# Patient Record
Sex: FEMALE | Race: WHITE | Employment: OTHER | URBAN - METROPOLITAN AREA
[De-identification: names, ages, dates, MRNs, and addresses within clinical notes are randomized per-mention and may not be internally consistent; named-entity substitution may affect disease eponyms.]

---

## 2021-05-01 ENCOUNTER — APPOINTMENT (OUTPATIENT)
Dept: CT IMAGING | Age: 81
End: 2021-05-01
Attending: PHYSICIAN ASSISTANT
Payer: MEDICARE

## 2021-05-01 ENCOUNTER — APPOINTMENT (OUTPATIENT)
Dept: GENERAL RADIOLOGY | Age: 81
End: 2021-05-01
Attending: PHYSICIAN ASSISTANT
Payer: MEDICARE

## 2021-05-01 ENCOUNTER — APPOINTMENT (OUTPATIENT)
Dept: GENERAL RADIOLOGY | Age: 81
End: 2021-05-01
Attending: EMERGENCY MEDICINE
Payer: MEDICARE

## 2021-05-01 ENCOUNTER — HOSPITAL ENCOUNTER (EMERGENCY)
Age: 81
Discharge: HOME | End: 2021-05-01
Attending: EMERGENCY MEDICINE
Payer: MEDICARE

## 2021-05-01 VITALS
WEIGHT: 110 LBS | BODY MASS INDEX: 18.78 KG/M2 | RESPIRATION RATE: 17 BRPM | DIASTOLIC BLOOD PRESSURE: 65 MMHG | OXYGEN SATURATION: 100 % | SYSTOLIC BLOOD PRESSURE: 139 MMHG | HEART RATE: 60 BPM | HEIGHT: 64 IN
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
## 2021-05-01 DIAGNOSIS — S52.021A OLECRANON FRACTURE, RIGHT, CLOSED, INITIAL ENCOUNTER: Primary | ICD-10-CM

## 2021-05-01 DIAGNOSIS — S82.002A CLOSED NONDISPLACED FRACTURE OF LEFT PATELLA, UNSPECIFIED FRACTURE MORPHOLOGY, INITIAL ENCOUNTER: ICD-10-CM

## 2021-05-01 PROCEDURE — 73080 X-RAY EXAM OF ELBOW: CPT

## 2021-05-01 PROCEDURE — 99283 EMERGENCY DEPT VISIT LOW MDM: CPT

## 2021-05-01 PROCEDURE — 73562 X-RAY EXAM OF KNEE 3: CPT

## 2021-05-01 PROCEDURE — 74011250637 HC RX REV CODE- 250/637: Performed by: PHYSICIAN ASSISTANT

## 2021-05-01 PROCEDURE — 70450 CT HEAD/BRAIN W/O DYE: CPT

## 2021-05-01 RX ORDER — HYDROCODONE BITARTRATE AND ACETAMINOPHEN 5; 325 MG/1; MG/1
1 TABLET ORAL
Qty: 10 TAB | Refills: 0 | Status: SHIPPED | OUTPATIENT
Start: 2021-05-01 | End: 2021-05-04

## 2021-05-01 RX ORDER — HYDROCODONE BITARTRATE AND ACETAMINOPHEN 5; 325 MG/1; MG/1
1 TABLET ORAL
Status: COMPLETED | OUTPATIENT
Start: 2021-05-01 | End: 2021-05-01

## 2021-05-01 RX ADMIN — HYDROCODONE BITARTRATE AND ACETAMINOPHEN 1 TABLET: 5; 325 TABLET ORAL at 12:58

## 2021-05-01 NOTE — ED TRIAGE NOTES
Pt reports she tripped on uneven pavement today injuring her right elbow and scraping her left knee, pt reports she may have hit her head but no lumps/bumps/bruising noted at this time. No LOC, pt does not take blood thinners.

## 2021-05-01 NOTE — ED NOTES
I have reviewed discharge instructions with the patient. The patient verbalized understanding. Patient left ED via Discharge Method: wheelchair to Home with daughter. Opportunity for questions and clarification provided. Patient given 1 scripts. To continue your aftercare when you leave the hospital, you may receive an automated call from our care team to check in on how you are doing. This is a free service and part of our promise to provide the best care and service to meet your aftercare needs.  If you have questions, or wish to unsubscribe from this service please call [phone]. Thank you for Choosing our [facility] Emergency Department.

## 2021-05-01 NOTE — ED PROVIDER NOTES
Patient is an 55-year-old female who presents to the emergency room with chief complaint of fall just prior to arrival.  She was at an [employer], tripped over a small elevated piece of concrete. She does not believe she hit her head however she did have some dizziness after the fall. She fell on her right elbow, left knee. Denies presyncopal symptoms such as chest pain, palpitations, shortness of breath, loss of consciousness. History reviewed. No pertinent past medical history. History reviewed. No pertinent surgical history. History reviewed. No pertinent family history.     Social History     Socioeconomic History    Marital status: Not on file     Spouse name: Not on file    Number of children: Not on file    Years of education: Not on file    Highest education level: Not on file   Occupational History    Not on file   Social Needs    Financial resource strain: Not on file    Food insecurity     Worry: Not on file     Inability: Not on file    Transportation needs     Medical: Not on file     Non-medical: Not on file   Tobacco Use    Smoking status: Never Smoker    Smokeless tobacco: Never Used   Substance and Sexual Activity    Alcohol use: Yes     Comment: occ    Drug use: Never    Sexual activity: Not on file   Lifestyle    Physical activity     Days per week: Not on file     Minutes per session: Not on file    Stress: Not on file   Relationships    Social connections     Talks on phone: Not on file     Gets together: Not on file     Attends Confucianist service: Not on file     Active member of club or organization: Not on file     Attends meetings of clubs or organizations: Not on file     Relationship status: Not on file    Intimate partner violence     Fear of current or ex partner: Not on file     Emotionally abused: Not on file     Physically abused: Not on file     Forced sexual activity: Not on file   Other Topics Concern    Not on file   Social History Narrative  Not on file         ALLERGIES: Compazine [prochlorperazine]    Review of Systems   Constitutional: Negative for chills and fever. HENT: Negative for facial swelling. Respiratory: Negative for chest tightness and shortness of breath. Cardiovascular: Negative for chest pain. Gastrointestinal: Negative for abdominal pain, nausea and vomiting. Musculoskeletal: Positive for arthralgias and joint swelling. Negative for myalgias. Skin: Positive for wound. Neurological: Negative for headaches. Psychiatric/Behavioral: Negative for confusion. All other systems reviewed and are negative. Vitals:    05/01/21 1119   BP: (!) 92/43   Pulse: (!) 56   Resp: 18   SpO2: 96%   Weight: 49.9 kg (110 lb)   Height: 5' 4\" (1.626 m)            Physical Exam  Constitutional:       Appearance: Normal appearance. HENT:      Head: Normocephalic and atraumatic. Mouth/Throat:      Mouth: Mucous membranes are moist.   Eyes:      Pupils: Pupils are equal, round, and reactive to light. Cardiovascular:      Rate and Rhythm: Normal rate and regular rhythm. Pulmonary:      Effort: No respiratory distress. Breath sounds: Normal breath sounds. Abdominal:      Palpations: Abdomen is soft. Tenderness: There is no abdominal tenderness. There is no guarding. Musculoskeletal:      Right elbow: She exhibits deformity. Left knee: Tenderness found. Legs:    Skin:     General: Skin is warm and dry. Neurological:      Mental Status: She is alert and oriented to person, place, and time. Mental status is at baseline. Psychiatric:         Mood and Affect: Mood normal.          MDM  Number of Diagnoses or Management Options  Closed nondisplaced fracture of left patella, unspecified fracture morphology, initial encounter  Fall from ground level  Olecranon fracture, right, closed, initial encounter  Diagnosis management comments:  This is an 22-year-old female who presented to the emergency room with chief complaint of having a fall just prior to arrival.  She landed directly on her left knee and right elbow. She had some short-lived dizziness after the accident. Her work-up today reveals a negative head CT for any acute intracranial abnormality. Her left knee x-ray shows a nondisplaced incomplete fracture of the anterior patella. X-ray of elbow demonstrates an olecranon process fracture with proximal avulsion. On-call orthopedics was consulted. Per Dr. John Michelle, patient can be placed in a posterior arm splint and knee immobilizer. Follow-up outpatient with Dr. Kenney Brand. Patient's pain was controlled in the emergency room with p.o. hydrocodone, she was sent home with pain medications as well. Patient understands indications for which to return to emergency room. Patient case was discussed with attending Dr. Lewis Serrano.        Amount and/or Complexity of Data Reviewed  Tests in the radiology section of CPT®: ordered and reviewed  Discussion of test results with the performing providers: yes  Decide to obtain previous medical records or to obtain history from someone other than the patient: yes (Daughter)  Obtain history from someone other than the patient: yes  Discuss the patient with other providers: yes  Independent visualization of images, tracings, or specimens: yes    Risk of Complications, Morbidity, and/or Mortality  Presenting problems: high  Diagnostic procedures: high  Management options: high    Patient Progress  Patient progress: improved         Procedures

## 2021-05-04 ENCOUNTER — ANESTHESIA EVENT (OUTPATIENT)
Dept: SURGERY | Age: 81
End: 2021-05-04
Payer: MEDICARE

## 2021-05-04 NOTE — H&P
History and physical    Patient: Alaina Alvarez MRN: [id number]  SSN: [id number]    YOB: 1940  Age: [de-identified] y.o. Sex: female      Subjective:      Alaina Alvarez is a [de-identified] y.o. female who fell and injured her right upper extremity as well as her left lower extremity. Her left knee really does not bother her much at all she does have an abrasion over this area and she does have a very small nondisplaced left patella fracture. The thing that is really bothering her the most is her right upper extremity. This is given her quite a bit of discomfort. She is a very healthy individual she is never had any surgery before she has never been hospitalized before she takes no medications. She is right-hand dominant. .    No past medical history on file. No past surgical history on file. FAMHX -No history of inflammatory arthritis   Social History     Tobacco Use    Smoking status: Never Smoker    Smokeless tobacco: Never Used   Substance Use Topics    Alcohol use: Yes     Comment: occ      Current Outpatient Medications   Medication Sig Dispense Refill    HYDROcodone-acetaminophen (Norco) 5-325 mg per tablet Take 1 Tab by mouth every six (6) hours as needed for Pain for up to 3 days. Max Daily Amount: 4 Tabs. 10 Tab 0        Allergies   Allergen Reactions    Compazine [Prochlorperazine] Other (comments)       Review of Systems:  A comprehensive review of systems was negative except for that written in the History of Present Illness. Objective: There were no vitals filed for this visit. Physical Exam:  Physical Exam:  General:  Alert, cooperative, no distress, appears stated age. Orientation she is alert and oriented person place time and situation   Eyes:  Conjunctivae/corneas clear. PERRL, EOMs intact. Fundi benign   Ears:  Normal TMs and external ear canals both ears. Nose: Nares normal. Septum midline. Mucosa normal. No drainage or sinus tenderness.    Mouth/Throat: Lips, mucosa, and tongue normal. Teeth and gums normal.   Neck: Supple, symmetrical, trachea midline, no adenopathy, thyroid: no enlargment/tenderness/nodules, no carotid bruit and no JVD. Back:   Symmetric, no curvature. ROM normal. No CVA tenderness. Lungs:   Clear to auscultation bilaterally. Heart:  Regular rate and rhythm, S1, S2 normal, no murmur, click, rub or gallop. Abdomen:   Soft, non-tender. Bowel sounds normal. No masses,  No organomegaly. No lymphadenopathy in all 4 extremities  Alignment-she has some obvious deformity of her right elbow no deformity of her left knee  Range of motion-pain with any range of motion of her right elbow, she does have full range of motion of her left knee actively with very little discomfort  Vascular-distal pulses palpable in both the right upper as well as left lower extremity  Sensory/motor-deep tendon reflexes normal both the right upper as well as the left lower extremity. Motor and sensory function intact. Stability-she does have some obvious instability at her right elbow no evidence of any instability left knee  Tenderness to palpation over the olecranon as well as just to a very small amount over the anterior aspect of the patella  Skin-she has no open wounds over her right upper extremity she does have a superficial abrasion over the superior aspect of her left knee which is not near her fracture site  Rodo Rosario has a near normal gait    Assessment:       Right closed displaced olecranon fracture, closed nondisplaced left patella fracture    Xrays and or studies:    I have reviewed x-rays which show a completely displaced right olecranon fracture, I have also reviewed her x-rays of her left knee.   These show a completely nondisplaced left patella fracture it appears that it may involve the anterior cortex and may not even go completely across the cortical surface  Plan:     I think with the fact that she is able to move her left knee so well and with the unimpressive appearance of her x-rays it would be reasonable just to treat the patella nonoperatively. I think him to take her out of her brace and allow her to be activity as tolerated on this. As far as her right upper extremity is concerned this is significantly displaced and does need to be treated with formal open reduction internal fixation. The plan will be to proceed with open reduction internal fixation of right olecranon fracture as an outpatient. I explained to the patient that I would likely either use a large 7.3 cannulated screw or a small standstill plate and screw construct and I view some illustrations to help her understand this. The plan will be to proceed with this this week as an outpatient.     Signed By: Delaney Delgadillo MD     May 4, 2021

## 2021-05-05 ENCOUNTER — ANESTHESIA (OUTPATIENT)
Dept: SURGERY | Age: 81
End: 2021-05-05
Payer: MEDICARE

## 2021-05-05 ENCOUNTER — HOSPITAL ENCOUNTER (OUTPATIENT)
Age: 81
Setting detail: OUTPATIENT SURGERY
Discharge: HOME | End: 2021-05-05
Attending: ORTHOPAEDIC SURGERY | Admitting: ORTHOPAEDIC SURGERY
Payer: MEDICARE

## 2021-05-05 ENCOUNTER — APPOINTMENT (OUTPATIENT)
Dept: GENERAL RADIOLOGY | Age: 81
End: 2021-05-05
Attending: ORTHOPAEDIC SURGERY
Payer: MEDICARE

## 2021-05-05 VITALS
RESPIRATION RATE: 16 BRPM | OXYGEN SATURATION: 98 % | DIASTOLIC BLOOD PRESSURE: 63 MMHG | HEIGHT: 64 IN | WEIGHT: 109.25 LBS | BODY MASS INDEX: 18.65 KG/M2 | HEART RATE: 65 BPM | TEMPERATURE: 98 F | SYSTOLIC BLOOD PRESSURE: 130 MMHG

## 2021-05-05 DIAGNOSIS — S52.021A OLECRANON FRACTURE, RIGHT, CLOSED, INITIAL ENCOUNTER: Primary | ICD-10-CM

## 2021-05-05 LAB
ABO + RH BLD: NORMAL
BASOPHILS # BLD: 0.1 K/UL (ref 0–0.2)
BASOPHILS NFR BLD: 1 % (ref 0–2)
BLOOD GROUP ANTIBODIES SERPL: NORMAL
DIFFERENTIAL METHOD BLD: ABNORMAL
EOSINOPHIL # BLD: 0 K/UL (ref 0–0.8)
EOSINOPHIL NFR BLD: 0 % (ref 0.5–7.8)
ERYTHROCYTE [DISTWIDTH] IN BLOOD BY AUTOMATED COUNT: 13.3 % (ref 11.9–14.6)
HCT VFR BLD AUTO: 35.3 % (ref 35.8–46.3)
HGB BLD-MCNC: 11.6 G/DL (ref 11.7–15.4)
IMM GRANULOCYTES # BLD AUTO: 0.1 K/UL (ref 0–0.5)
IMM GRANULOCYTES NFR BLD AUTO: 1 % (ref 0–5)
LYMPHOCYTES # BLD: 0.9 K/UL (ref 0.5–4.6)
LYMPHOCYTES NFR BLD: 8 % (ref 13–44)
MCH RBC QN AUTO: 30.3 PG (ref 26.1–32.9)
MCHC RBC AUTO-ENTMCNC: 32.9 G/DL (ref 31.4–35)
MCV RBC AUTO: 92.2 FL (ref 79.6–97.8)
MONOCYTES # BLD: 0.8 K/UL (ref 0.1–1.3)
MONOCYTES NFR BLD: 7 % (ref 4–12)
NEUTS SEG # BLD: 9.2 K/UL (ref 1.7–8.2)
NEUTS SEG NFR BLD: 83 % (ref 43–78)
NRBC # BLD: 0 K/UL (ref 0–0.2)
PLATELET # BLD AUTO: 191 K/UL (ref 150–450)
PMV BLD AUTO: 11.4 FL (ref 9.4–12.3)
RBC # BLD AUTO: 3.83 M/UL (ref 4.05–5.2)
SPECIMEN EXP DATE BLD: NORMAL
WBC # BLD AUTO: 11 K/UL (ref 4.3–11.1)

## 2021-05-05 PROCEDURE — 74011250636 HC RX REV CODE- 250/636: Performed by: NURSE ANESTHETIST, CERTIFIED REGISTERED

## 2021-05-05 PROCEDURE — 77030003602 HC NDL NRV BLK BBMI -B: Performed by: STUDENT IN AN ORGANIZED HEALTH CARE EDUCATION/TRAINING PROGRAM

## 2021-05-05 PROCEDURE — 74011250636 HC RX REV CODE- 250/636: Performed by: ORTHOPAEDIC SURGERY

## 2021-05-05 PROCEDURE — 24685 OPTX ULNAR FX PROX END W/FIX: CPT | Performed by: ORTHOPAEDIC SURGERY

## 2021-05-05 PROCEDURE — C1769 GUIDE WIRE: HCPCS | Performed by: ORTHOPAEDIC SURGERY

## 2021-05-05 PROCEDURE — 76210000006 HC OR PH I REC 0.5 TO 1 HR: Performed by: ORTHOPAEDIC SURGERY

## 2021-05-05 PROCEDURE — 76060000033 HC ANESTHESIA 1 TO 1.5 HR: Performed by: ORTHOPAEDIC SURGERY

## 2021-05-05 PROCEDURE — C1713 ANCHOR/SCREW BN/BN,TIS/BN: HCPCS | Performed by: ORTHOPAEDIC SURGERY

## 2021-05-05 PROCEDURE — 73070 X-RAY EXAM OF ELBOW: CPT

## 2021-05-05 PROCEDURE — 74011250636 HC RX REV CODE- 250/636: Performed by: STUDENT IN AN ORGANIZED HEALTH CARE EDUCATION/TRAINING PROGRAM

## 2021-05-05 PROCEDURE — 76010000161 HC OR TIME 1 TO 1.5 HR INTENSV-TIER 1: Performed by: ORTHOPAEDIC SURGERY

## 2021-05-05 PROCEDURE — 76010010054 HC POST OP PAIN BLOCK: Performed by: ORTHOPAEDIC SURGERY

## 2021-05-05 PROCEDURE — 77030010509 HC AIRWY LMA MSK TELE -A: Performed by: STUDENT IN AN ORGANIZED HEALTH CARE EDUCATION/TRAINING PROGRAM

## 2021-05-05 PROCEDURE — 76210000020 HC REC RM PH II FIRST 0.5 HR: Performed by: ORTHOPAEDIC SURGERY

## 2021-05-05 PROCEDURE — 74011250637 HC RX REV CODE- 250/637: Performed by: ORTHOPAEDIC SURGERY

## 2021-05-05 PROCEDURE — 85025 COMPLETE CBC W/AUTO DIFF WBC: CPT

## 2021-05-05 PROCEDURE — 86850 RBC ANTIBODY SCREEN: CPT

## 2021-05-05 PROCEDURE — 77030006788 HC BLD SAW OSC STRY -B: Performed by: ORTHOPAEDIC SURGERY

## 2021-05-05 PROCEDURE — 77030040922 HC BLNKT HYPOTHRM STRY -A: Performed by: STUDENT IN AN ORGANIZED HEALTH CARE EDUCATION/TRAINING PROGRAM

## 2021-05-05 PROCEDURE — 76942 ECHO GUIDE FOR BIOPSY: CPT | Performed by: ORTHOPAEDIC SURGERY

## 2021-05-05 PROCEDURE — 2709999900 HC NON-CHARGEABLE SUPPLY: Performed by: ORTHOPAEDIC SURGERY

## 2021-05-05 PROCEDURE — 77030004434 HC BUR RND STRY -B: Performed by: ORTHOPAEDIC SURGERY

## 2021-05-05 PROCEDURE — 74011000250 HC RX REV CODE- 250: Performed by: NURSE ANESTHETIST, CERTIFIED REGISTERED

## 2021-05-05 PROCEDURE — 77030016458 HC BIT DRL CANN1 SYNT -F: Performed by: ORTHOPAEDIC SURGERY

## 2021-05-05 PROCEDURE — 77030019908 HC STETH ESOPH SIMS -A: Performed by: STUDENT IN AN ORGANIZED HEALTH CARE EDUCATION/TRAINING PROGRAM

## 2021-05-05 PROCEDURE — 77030020754 HC CUF TRNQT 2BLA STRY -B: Performed by: ORTHOPAEDIC SURGERY

## 2021-05-05 DEVICE — WASHER ORTH DIA13MM FOR CANN SCR: Type: IMPLANTABLE DEVICE | Site: ELBOW | Status: FUNCTIONAL

## 2021-05-05 DEVICE — IMPLANTABLE DEVICE: Type: IMPLANTABLE DEVICE | Site: ELBOW | Status: FUNCTIONAL

## 2021-05-05 RX ORDER — FENTANYL CITRATE 50 UG/ML
100 INJECTION, SOLUTION INTRAMUSCULAR; INTRAVENOUS ONCE
Status: COMPLETED | OUTPATIENT
Start: 2021-05-05 | End: 2021-05-05

## 2021-05-05 RX ORDER — ROPIVACAINE HYDROCHLORIDE 5 MG/ML
INJECTION, SOLUTION EPIDURAL; INFILTRATION; PERINEURAL
Status: DISCONTINUED | OUTPATIENT
Start: 2021-05-05 | End: 2021-05-05 | Stop reason: HOSPADM

## 2021-05-05 RX ORDER — SODIUM CHLORIDE 0.9 % (FLUSH) 0.9 %
5-40 SYRINGE (ML) INJECTION EVERY 8 HOURS
Status: DISCONTINUED | OUTPATIENT
Start: 2021-05-05 | End: 2021-05-05 | Stop reason: HOSPADM

## 2021-05-05 RX ORDER — CEFAZOLIN SODIUM/WATER 2 G/20 ML
2 SYRINGE (ML) INTRAVENOUS ONCE
Status: DISCONTINUED | OUTPATIENT
Start: 2021-05-05 | End: 2021-05-05

## 2021-05-05 RX ORDER — DIPHENHYDRAMINE HYDROCHLORIDE 50 MG/ML
12.5 INJECTION, SOLUTION INTRAMUSCULAR; INTRAVENOUS
Status: DISCONTINUED | OUTPATIENT
Start: 2021-05-05 | End: 2021-05-05 | Stop reason: HOSPADM

## 2021-05-05 RX ORDER — LIDOCAINE HYDROCHLORIDE 20 MG/ML
INJECTION, SOLUTION EPIDURAL; INFILTRATION; INTRACAUDAL; PERINEURAL AS NEEDED
Status: DISCONTINUED | OUTPATIENT
Start: 2021-05-05 | End: 2021-05-05 | Stop reason: HOSPADM

## 2021-05-05 RX ORDER — PROPOFOL 10 MG/ML
INJECTION, EMULSION INTRAVENOUS AS NEEDED
Status: DISCONTINUED | OUTPATIENT
Start: 2021-05-05 | End: 2021-05-05 | Stop reason: HOSPADM

## 2021-05-05 RX ORDER — OXYCODONE HYDROCHLORIDE 5 MG/1
5 TABLET ORAL
Status: DISCONTINUED | OUTPATIENT
Start: 2021-05-05 | End: 2021-05-05 | Stop reason: HOSPADM

## 2021-05-05 RX ORDER — SODIUM CHLORIDE 0.9 % (FLUSH) 0.9 %
5-40 SYRINGE (ML) INJECTION AS NEEDED
Status: DISCONTINUED | OUTPATIENT
Start: 2021-05-05 | End: 2021-05-05 | Stop reason: HOSPADM

## 2021-05-05 RX ORDER — MIDAZOLAM HYDROCHLORIDE 1 MG/ML
2 INJECTION, SOLUTION INTRAMUSCULAR; INTRAVENOUS ONCE
Status: COMPLETED | OUTPATIENT
Start: 2021-05-05 | End: 2021-05-05

## 2021-05-05 RX ORDER — LIDOCAINE HYDROCHLORIDE 10 MG/ML
0.1 INJECTION INFILTRATION; PERINEURAL AS NEEDED
Status: DISCONTINUED | OUTPATIENT
Start: 2021-05-05 | End: 2021-05-05 | Stop reason: HOSPADM

## 2021-05-05 RX ORDER — ONDANSETRON 2 MG/ML
INJECTION INTRAMUSCULAR; INTRAVENOUS AS NEEDED
Status: DISCONTINUED | OUTPATIENT
Start: 2021-05-05 | End: 2021-05-05 | Stop reason: HOSPADM

## 2021-05-05 RX ORDER — FLUMAZENIL 0.1 MG/ML
0.2 INJECTION INTRAVENOUS
Status: DISCONTINUED | OUTPATIENT
Start: 2021-05-05 | End: 2021-05-05 | Stop reason: HOSPADM

## 2021-05-05 RX ORDER — EPHEDRINE SULFATE/0.9% NACL/PF 50 MG/5 ML
SYRINGE (ML) INTRAVENOUS AS NEEDED
Status: DISCONTINUED | OUTPATIENT
Start: 2021-05-05 | End: 2021-05-05 | Stop reason: HOSPADM

## 2021-05-05 RX ORDER — SODIUM CHLORIDE, SODIUM LACTATE, POTASSIUM CHLORIDE, CALCIUM CHLORIDE 600; 310; 30; 20 MG/100ML; MG/100ML; MG/100ML; MG/100ML
100 INJECTION, SOLUTION INTRAVENOUS CONTINUOUS
Status: DISCONTINUED | OUTPATIENT
Start: 2021-05-05 | End: 2021-05-05 | Stop reason: HOSPADM

## 2021-05-05 RX ORDER — NALOXONE HYDROCHLORIDE 0.4 MG/ML
0.1 INJECTION, SOLUTION INTRAMUSCULAR; INTRAVENOUS; SUBCUTANEOUS AS NEEDED
Status: DISCONTINUED | OUTPATIENT
Start: 2021-05-05 | End: 2021-05-05 | Stop reason: HOSPADM

## 2021-05-05 RX ORDER — HYDROCODONE BITARTRATE AND ACETAMINOPHEN 5; 325 MG/1; MG/1
2 TABLET ORAL
Qty: 35 TAB | Refills: 0 | Status: SHIPPED | OUTPATIENT
Start: 2021-05-05 | End: 2021-05-10

## 2021-05-05 RX ORDER — NALOXONE HYDROCHLORIDE 0.4 MG/ML
0.1 INJECTION, SOLUTION INTRAMUSCULAR; INTRAVENOUS; SUBCUTANEOUS
Status: DISCONTINUED | OUTPATIENT
Start: 2021-05-05 | End: 2021-05-05 | Stop reason: HOSPADM

## 2021-05-05 RX ORDER — DEXAMETHASONE SODIUM PHOSPHATE 4 MG/ML
INJECTION, SOLUTION INTRA-ARTICULAR; INTRALESIONAL; INTRAMUSCULAR; INTRAVENOUS; SOFT TISSUE AS NEEDED
Status: DISCONTINUED | OUTPATIENT
Start: 2021-05-05 | End: 2021-05-05 | Stop reason: HOSPADM

## 2021-05-05 RX ORDER — HYDROMORPHONE HYDROCHLORIDE 1 MG/ML
0.5 INJECTION, SOLUTION INTRAMUSCULAR; INTRAVENOUS; SUBCUTANEOUS
Status: DISCONTINUED | OUTPATIENT
Start: 2021-05-05 | End: 2021-05-05 | Stop reason: HOSPADM

## 2021-05-05 RX ORDER — CEFAZOLIN SODIUM/WATER 2 G/20 ML
2 SYRINGE (ML) INTRAVENOUS ONCE
Status: COMPLETED | OUTPATIENT
Start: 2021-05-05 | End: 2021-05-05

## 2021-05-05 RX ADMIN — Medication 10 MG: at 11:53

## 2021-05-05 RX ADMIN — LIDOCAINE HYDROCHLORIDE 40 MG: 20 INJECTION, SOLUTION EPIDURAL; INFILTRATION; INTRACAUDAL; PERINEURAL at 11:42

## 2021-05-05 RX ADMIN — Medication 10 MG: at 11:52

## 2021-05-05 RX ADMIN — CEFAZOLIN 2 G: 1 INJECTION, POWDER, FOR SOLUTION INTRAVENOUS at 11:46

## 2021-05-05 RX ADMIN — MIDAZOLAM 1 MG: 1 INJECTION INTRAMUSCULAR; INTRAVENOUS at 09:46

## 2021-05-05 RX ADMIN — SODIUM CHLORIDE, SODIUM LACTATE, POTASSIUM CHLORIDE, AND CALCIUM CHLORIDE 100 ML/HR: 600; 310; 30; 20 INJECTION, SOLUTION INTRAVENOUS at 09:34

## 2021-05-05 RX ADMIN — ROPIVACAINE HYDROCHLORIDE 20 ML: 150 INJECTION, SOLUTION EPIDURAL; INFILTRATION; PERINEURAL at 09:48

## 2021-05-05 RX ADMIN — FENTANYL CITRATE 50 MCG: 50 INJECTION, SOLUTION INTRAMUSCULAR; INTRAVENOUS at 09:46

## 2021-05-05 RX ADMIN — PROPOFOL 80 MG: 10 INJECTION, EMULSION INTRAVENOUS at 11:42

## 2021-05-05 RX ADMIN — DEXAMETHASONE SODIUM PHOSPHATE 4 MG: 4 INJECTION, SOLUTION INTRAMUSCULAR; INTRAVENOUS at 12:33

## 2021-05-05 RX ADMIN — SODIUM CHLORIDE, SODIUM LACTATE, POTASSIUM CHLORIDE, AND CALCIUM CHLORIDE: 600; 310; 30; 20 INJECTION, SOLUTION INTRAVENOUS at 12:34

## 2021-05-05 RX ADMIN — ONDANSETRON 4 MG: 2 INJECTION INTRAMUSCULAR; INTRAVENOUS at 12:33

## 2021-05-05 RX ADMIN — Medication 3 AMPULE: at 09:34

## 2021-05-05 NOTE — ANESTHESIA PREPROCEDURE EVALUATION
Relevant Problems   No relevant active problems       Anesthetic History   No history of anesthetic complications            Review of Systems / Medical History  Patient summary reviewed, nursing notes reviewed and pertinent labs reviewed    Pulmonary  Within defined limits                 Neuro/Psych   Within defined limits           Cardiovascular              Hyperlipidemia    Exercise tolerance: >4 METS     GI/Hepatic/Renal  Within defined limits              Endo/Other  Within defined limits           Other Findings              Physical Exam    Airway  Mallampati: II  TM Distance: 4 - 6 cm  Neck ROM: normal range of motion   Mouth opening: Normal     Cardiovascular  Regular rate and rhythm,  S1 and S2 normal,  no murmur, click, rub, or gallop             Dental    Dentition: Poor dentition     Pulmonary  Breath sounds clear to auscultation               Abdominal         Other Findings            Anesthetic Plan    ASA: 2  Anesthesia type: general      Post-op pain plan if not by surgeon: peripheral nerve block single    Induction: Intravenous  Anesthetic plan and risks discussed with: Patient

## 2021-05-05 NOTE — DISCHARGE INSTRUCTIONS
ACTIVITY AFTER DISCHARGE Patient should: Restrict lifting Leave splint in place, no lifting right upper extremity   Leave splint in place, no lifting right upper extremity  Patient should: Restrict lifting  DIET REGULAR Resume her regular diet  DRESSING, DO NOT REMOVE Keep clean, dry and intact until next clinic visit      ACTIVITY  · As tolerated and as directed by your doctor. · Bathe or shower as directed by your doctor. DIET  · Clear liquids until no nausea or vomiting; then light diet for the first day. · Advance to regular diet on second day, unless your doctor orders otherwise. · If nausea and vomiting continues, call your doctor. PAIN  · Take pain medication as directed by your doctor. · Call your doctor if pain is NOT relieved by medication. · DO NOT take aspirin of blood thinners unless directed by your doctor. CALL YOUR DOCTOR IF   · Excessive bleeding that does not stop after holding pressure over the area  · Temperature of 101 degrees F or above  · Excessive redness, swelling or bruising, and/ or green or yellow, smelly discharge from incision    After general anesthesia or intravenous sedation, for 24 hours or while taking prescription Narcotics:  · Limit your activities  · A responsible adult needs to be with you for the next 24 hours  · Do not drive and operate hazardous machinery  · Do not make important personal or business decisions  · Do not drink alcoholic beverages  · If you have not urinated within 8 hours after discharge, and you are experiencing discomfort from urinary retention, please go to the nearest ED. · If you have sleep apnea and have a CPAP machine, please use it for all naps and sleeping. · Please use caution when taking narcotics and any of your home medications that may cause drowsiness. *  Please give a list of your current medications to your Primary Care Provider.   *  Please update this list whenever your medications are discontinued, doses are changed, or new medications (including over-the-counter products) are added. *  Please carry medication information at all times in case of emergency situations. These are general instructions for a healthy lifestyle:  No smoking/ No tobacco products/ Avoid exposure to second hand smoke  Surgeon General's Warning:  Quitting smoking now greatly reduces serious risk to your health. Obesity, smoking, and sedentary lifestyle greatly increases your risk for illness  A healthy diet, regular physical exercise & weight monitoring are important for maintaining a healthy lifestyle    You may be retaining fluid if you have a history of heart failure or if you experience any of the following symptoms:  Weight gain of 3 pounds or more overnight or 5 pounds in a week, increased swelling in our hands or feet or shortness of breath while lying flat in bed. Please call your doctor as soon as you notice any of these symptoms; do not wait until your next office visit.

## 2021-05-05 NOTE — H&P
History and physical     Patient: Katrin Shelley MRN: [id number]  SSN: [id number]    YOB: 1940  Age: [de-identified] y.o. Sex: female       Subjective:      Katrin Shelley is a [de-identified] y.o. female who fell and injured her right upper extremity as well as her left lower extremity. Her left knee really does not bother her much at all she does have an abrasion over this area and she does have a very small nondisplaced left patella fracture. The thing that is really bothering her the most is her right upper extremity. This is given her quite a bit of discomfort. She is a very healthy individual she is never had any surgery before she has never been hospitalized before she takes no medications. She is right-hand dominant. .     No past medical history on file. No past surgical history on file. FAMHX -No history of inflammatory arthritis   Social History            Tobacco Use    Smoking status: Never Smoker    Smokeless tobacco: Never Used   Substance Use Topics    Alcohol use: Yes       Comment: occ             Current Outpatient Medications   Medication Sig Dispense Refill    HYDROcodone-acetaminophen (Norco) 5-325 mg per tablet Take 1 Tab by mouth every six (6) hours as needed for Pain for up to 3 days. Max Daily Amount: 4 Tabs. 10 Tab 0              Allergies   Allergen Reactions    Compazine [Prochlorperazine] Other (comments)         Review of Systems:  A comprehensive review of systems was negative except for that written in the History of Present Illness.     Objective:      There were no vitals filed for this visit.      Physical Exam:  Physical Exam:  General:  Alert, cooperative, no distress, appears stated age. Orientation she is alert and oriented person place time and situation   Eyes:  Conjunctivae/corneas clear. PERRL, EOMs intact. Fundi benign   Ears:  Normal TMs and external ear canals both ears. Nose: Nares normal. Septum midline. Mucosa normal. No drainage or sinus tenderness. Mouth/Throat: Lips, mucosa, and tongue normal. Teeth and gums normal.   Neck: Supple, symmetrical, trachea midline, no adenopathy, thyroid: no enlargment/tenderness/nodules, no carotid bruit and no JVD. Back:   Symmetric, no curvature. ROM normal. No CVA tenderness. Lungs:   Clear to auscultation bilaterally. Heart:  Regular rate and rhythm, S1, S2 normal, no murmur, click, rub or gallop. Abdomen:   Soft, non-tender. Bowel sounds normal. No masses,  No organomegaly.         No lymphadenopathy in all 4 extremities  Alignment-she has some obvious deformity of her right elbow no deformity of her left knee  Range of motion-pain with any range of motion of her right elbow, she does have full range of motion of her left knee actively with very little discomfort  Vascular-distal pulses palpable in both the right upper as well as left lower extremity  Sensory/motor-deep tendon reflexes normal both the right upper as well as the left lower extremity. Motor and sensory function intact. Stability-she does have some obvious instability at her right elbow no evidence of any instability left knee  Tenderness to palpation over the olecranon as well as just to a very small amount over the anterior aspect of the patella  Skin-she has no open wounds over her right upper extremity she does have a superficial abrasion over the superior aspect of her left knee which is not near her fracture site  Aleyda Colon has a near normal gait     Assessment:      Right closed displaced olecranon fracture, closed nondisplaced left patella fracture     Xrays and or studies:     I have reviewed x-rays which show a completely displaced right olecranon fracture, I have also reviewed her x-rays of her left knee.   These show a completely nondisplaced left patella fracture it appears that it may involve the anterior cortex and may not even go completely across the cortical surface  Plan:      I think with the fact that she is able to move her left knee so well and with the unimpressive appearance of her x-rays it would be reasonable just to treat the patella nonoperatively. I think him to take her out of her brace and allow her to be activity as tolerated on this. As far as her right upper extremity is concerned this is significantly displaced and does need to be treated with formal open reduction internal fixation. The plan will be to proceed with open reduction internal fixation of right olecranon fracture as an outpatient. I explained to the patient that I would likely either use a large 7.3 cannulated screw or a small standstill plate and screw construct and I view some illustrations to help her understand this.   The plan will be to proceed with this this week as an outpatient.     Signed By: Simeon Li MD      May 4, 2021           Electronically signed by Sheridan Sadler MD at 05/04/21 6277  Note Details    Author Sheridan Sadler MD File Time 05/04/21 1235   Author Type Physician Status Signed   Last  Sheridan Sadler MD Specialty Orthopedic Surgery      Office Visit on 5/4/2021        Detailed Report        Note shared with patient

## 2021-05-05 NOTE — INTERVAL H&P NOTE
Update History & Physical  The Patient's History and Physical of 5/4/2021 was reviewed with the patient and I examined the patient. There was no change. The surgical site was confirmed by the patient and me. Plan:  The risk, benefits, expected outcome, and alternative to the recommended procedure have been discussed with the patient. Patient understands and wants to proceed with open reduction internal fixation right olecranon fracture.     Electronically signed by Zev Milton MD on 5/5/2021 at 10:56 AM

## 2021-05-05 NOTE — ANESTHESIA POSTPROCEDURE EVALUATION
Procedure(s):  OPEN REDUCTION INTERNAL FIXATION RIGHT OLECRANON. general    Anesthesia Post Evaluation      Multimodal analgesia: multimodal analgesia used between 6 hours prior to anesthesia start to PACU discharge  Patient location during evaluation: bedside  Patient participation: complete - patient participated  Level of consciousness: awake and alert  Pain management: adequate  Airway patency: patent  Anesthetic complications: no  Cardiovascular status: acceptable  Respiratory status: acceptable  Hydration status: acceptable  Post anesthesia nausea and vomiting:  controlled  Final Post Anesthesia Temperature Assessment:  Normothermia (36.0-37.5 degrees C)      INITIAL Post-op Vital signs:   Vitals Value Taken Time   /63 05/05/21 1319   Temp 36.7 °C (98 °F) 05/05/21 1319   Pulse 68 05/05/21 1330   Resp 16 05/05/21 1319   SpO2 93 % 05/05/21 1330   Vitals shown include unvalidated device data.

## 2021-05-05 NOTE — OP NOTES
Operative Report    Patient: Juan Daniel Sampson MRN: [id number]  SSN: [id number]    YOB: 1940  Age: [de-identified] y.o. Sex: female       Date of Surgery: 5/5/2021     History:  Juan Daniel Sampson is a [de-identified] y.o. female fell and injured her right elbow. She was seen in the outpatient setting and found to have a right olecranon fracture. This was completely displaced. She also had what appeared to be a completely nondisplaced left patella fracture was treated nonoperatively. I talked her about the fact that I do think it is important to treat her olecranon fracture operatively as this was completely displaced and highly unstable I try to explain exactly what this would involve with the primary plan being to treat this with intramedullary screw fixation and try to use of illustrations to help her understand exactly what this would involve. After talking her about this she seemed to feel comfortable consenting. I talked to the patient and/or their representative and explained the exact nature the procedure. I also went through a detailed list of the material risks associated with  the procedure which included risk of bleeding, infection, injury to nearby structures, worsening the situation, as well as the risks associate with anesthesia and finally death. Also talked with him regarding the benefits and alternatives to the procedure.     Preoperative Diagnosis: Closed fracture of olecranon process of right ulna, initial encounter [S52.021A]     Postoperative Diagnosis: Closed fracture of olecranon process of right ulna, initial encounter [S52.021A]     Surgeon(s) and Role:     * Tiffany oBb MD - Primary    Anesthesia: General     Procedure: Procedure(s):  OPEN REDUCTION INTERNAL FIXATION RIGHT OLECRANON     Procedure in Detail: After the successful duction of general anesthetic as well as a regional block for postoperative analgesia the right upper extremity was prepped and draped in usual sterile fashion she was placed in the left lateral decubitus position and the right extremity was placed over a bump. We then used a sterile tourniquet inflated 250 mmHg I made an incision curving around the olecranon and then exposed the olecranon fracture itself. Once I had this exposed I freed up some of the hematoma with a curette then reduce this and held reduced with 2 reduction forceps were then placed a guidewire for the Synthes 7.3 cannulated screw as an intramedullary screw across the fracture site. Once this was in appropriate position I drilled and used the tap to come up with my length and this ended up being 120 mm in length and I then remove the tap placed the actual screw. As the screw was almost completely tightened I did make a small drill hole in the diaphyseal portion of the proximal ulna and placed a figure-of-eight 18-gauge wire going through this hole in the cortex of the ulna as well as around the cannulated screw and then tension this and then finished tightening my screw all the way. I then  my reduction as well as my stability clinically as well as radiographically is very pleased with the overall reduction of this as well as with the placement hardware radiographically I cut and bent the figure-of-eight wire irrigated with normal saline closed the subcutaneous tissue with two-point oh strata fix suture the skin was then closed with staples. Dressings were applied as well as a posterior splint. The patient was awakened and taken to cover him in stable condition of no apparent complications      Estimated Blood Loss: 90 cc    Tourniquet Time:   Total Tourniquet Time Documented:  Upper Arm (Right) - 24 minutes  Total: Upper Arm (Right) - 24 minutes        Implants:   Implant Name Type Inv. Item Serial No.  Lot No. LRB No. Used Action   SCREW BNE L120MM DIA7. 3MM THRD L32MM CANC S STL ST SELF DRL - [id number]  SCREW BNE L120MM DIA7. 3MM THRD L32MM CANC S STL ST SELF DRL [place] [employer]WD [id number] Right 1 Implanted   WASHER ORTH [id number] FOR NARCISO SCR - [id number]  WASHER ORTH [id number] FOR NARCISO SCR  DE[employer] [place]_WD [id number] Right 1 Implanted               Specimens: * No specimens in log *        Drains: None                Complications: None    Counts: Sponge and needle counts were correct times two.     Signed By:  Nicola Nolan MD     May 5, 2021

## 2021-05-05 NOTE — ANESTHESIA PROCEDURE NOTES
Peripheral Block    Start time: 5/5/2021 9:44 AM  End time: 5/5/2021 9:48 AM  Performed by: Hazel May MD  Authorized by: Hazel May MD       Pre-procedure:    Indications: at surgeon's request and post-op pain management    Preanesthetic Checklist: patient identified, risks and benefits discussed, site marked, timeout performed, anesthesia consent given and patient being monitored    Timeout Time: 09:44          Block Type:   Block Type:  Supraclavicular  Laterality:  Right  Monitoring:  Standard ASA monitoring, responsive to questions, oxygen, continuous pulse ox, frequent vital sign checks and heart rate  Injection Technique:  Single shot  Procedures: ultrasound guided    Patient Position: supine  Prep: alcohol    Location:  Supraclavicular  Needle Type:  Stimuplex  Needle Gauge:  20 G  Needle Localization:  Ultrasound guidance  Medication Injected:  Ropivacaine (PF) (NAROPIN)(0.5%) 5 mg/mL injection, 20 mL  Med Admin Time: 5/5/2021 9:48 AM    Assessment:  Number of attempts:  1  Injection Assessment:  Incremental injection every 5 mL, negative aspiration for CSF, no paresthesia, ultrasound image on chart, local visualized surrounding nerve on ultrasound, negative aspiration for blood and no intravascular symptoms  Patient tolerance:  Patient tolerated the procedure well with no immediate complications

## 2021-06-07 ENCOUNTER — HOSPITAL ENCOUNTER (OUTPATIENT)
Dept: PHYSICAL THERAPY | Age: 81
Discharge: HOME | End: 2021-06-07
Payer: MEDICARE

## 2021-06-07 DIAGNOSIS — S52.021D CLOSED FRACTURE OF OLECRANON PROCESS OF RIGHT ULNA WITH ROUTINE HEALING, SUBSEQUENT ENCOUNTER: ICD-10-CM

## 2021-06-07 PROCEDURE — 97110 THERAPEUTIC EXERCISES: CPT

## 2021-06-07 PROCEDURE — 97140 MANUAL THERAPY 1/> REGIONS: CPT

## 2021-06-07 PROCEDURE — 97161 PT EVAL LOW COMPLEX 20 MIN: CPT

## 2021-06-07 NOTE — THERAPY EVALUATION
Jermain Avilar : 1940 Payor: Danie Arriaza / Plan: Mark Sanon / Product Type: Managed Care Medicare /  [address] [place] [address]  at [place] [address] Therapy 
[address], [address] [place] [address], [address] [phone]   [phone] OUTPATIENT PHYSICAL THERAPY:Initial Assessment 2021 ICD-10: Treatment Diagnosis: pain in right elbow M25.521, pain in left knee M25.562, Closed fracture of olecranon process of right ulna with routine healing, subsequent encounter [S52.021D] Precautions/Allergies:  
Amoxicillin-pot clavulanate, Ciprofloxacin, Compazine [prochlorperazine], and Prochlorperazine edisylate TREATMENT PLAN: 
Effective Dates: 2021 TO 2021 (90 days). Frequency/Duration: 2 times a week for 90 Day(s) and upon reassessment, will adjust frequency and duration as progress indicates. MEDICAL/REFERRING DIAGNOSIS: 
Closed fracture of olecranon process of right ulna with routine healing, subsequent encounter [S52.021D] DATE OF ONSET: fall May 1st 
REFERRING PHYSICIAN: Ruby Joy MD MD Orders: Josephine Vegas and treat Return MD Appointment: in next several weeks INITIAL ASSESSMENT:  Ms. Jamila Damon presents with loss of right elbow motion and strength due to recent fracture of olecranon followed by closed reduction and fixation. Pt also fractured left patella and she was treated nonoperatively. Pt reports her biggest complaint is the elbow and she is limited in full dressing, ADL's and hobbies. She would like to improve her overall elbow motion and strength so she can use the arm normally without restrictions. PROBLEM LIST (Impacting functional limitations): 1. Decreased Strength 2. Decreased ADL/Functional Activities 3. Increased Pain 4. Decreased Activity Tolerance 5. Decreased Flexibility/Joint Mobility INTERVENTIONS PLANNED: (Treatment may consist of any combination of the following) 1. Heat 2. Home Exercise Program (HEP) 3.  Manual Therapy 4. Range of Motion (ROM) 5. Therapeutic Activites 6. Therapeutic Exercise/Strengthening GOALS: (Goals have been discussed and agreed upon with patient.) Short-Term Functional Goals: Time Frame: 6 weeks 1. Establish independent HEP with no cuing. 2. Pt will be able to report independence with dressing and bathing. 3. Pt will be able to use the right UE and hand for eating, meal preparations. 4. Pt will be able to use R UE to push or pull open car doors and house/office doors. Discharge Goals: Time Frame: 12 weeks 1. Pt will be able to improve score on [place] scale by at least 5 points for advanced ADL's. 
2. Pt will be able to gain 5-10 degrees of elbow motion needed for hair styling. 3. Pt will be able to gain 1/2 grade increase in strength needed to lift and carry cookware, dinner plates, purse. 4. Pt will be able to report climbing steps reciprocally. OUTCOME MEASURE:  
Tool Used: Lower Extremity Functional Scale (LEFS) Score:  Initial: 34/80 Most Recent: X/80 (Date: -- ) Interpretation of Score: 20 questions each scored on a 5 point scale with 0 representing \"extreme difficulty or unable to perform\" and 4 representing \"no difficulty\". The lower the score, the greater the functional disability. 80/80 represents no disability. Minimal detectable change is 9 points. OUTCOME: SENDY Shoulder Score Initial Score: 25/100 Most Recent: X/100 (Date: XX/XX) Interpretation of Score: 20 questions each scored on a 3 point scale with 0 representing \"extreme difficulty or unable to perform\" and 3 representing \"no difficulty\", 3 questions each scored from 0-10 about pain, and 1 question scored 0-10 about function of the shoulder  The lower the score, the greater the functional disability. 100/100 represents no disability, pain or loss of function. Minimal clinically important difference is 11.4. Minimal detectable change is 12.1.  
 
MEDICAL NECESSITY:  
· Patient is expected to demonstrate progress in strength and range of motion to increase independence with her ADL's and hobbies that incorporate the use of her R UE. 
REASON FOR SERVICES/OTHER COMMENTS: 
· Patient continues to require skilled intervention due to lack of full right elbow ROM and strength as well as full strength in left LE. Total Duration: PT Patient Time In/Time Out Time In: 5482 Time Out: 1110 Rehabilitation Potential For Stated Goals: Good Regarding Judah Irby's therapy, I certify that the treatment plan above will be carried out by a therapist or under their direction. Thank you for this referral, 
Daryn Peck PT Referring Physician Signature: Loli Maldonado MD _______________________________ Date _____________ PAIN/SUBJECTIVE:  
Initial: Pain Intensity 1: 8  Post Session:  7/10 HISTORY:  
History of Injury/Illness (Reason for Referral): 
Pt reporting she was walking down uneven driveway and tripped and fractured left patella and right olecranon. Fall was May 1st.  She was seen in ER first and followed by MD.  Non operative treatment for left knee and operative treatment for the right elbow to include closed reduction and fixation. She can now begin therapy for ROM and light strengthening of the right elbow and no restrictions noted for the L LE. Past Medical History/Comorbidities: Ms. Nilay Valdez  has a past medical history of Anxiety and Pure hypercholesterolemia. Ms. Nilay Valdez  has no past surgical history on file. Social History/Living Environment:  
  pt lives with daughter in tri level home-roughly five steps Prior Level of Function/Work/Activity: 
[employer] lover, enjoys estate sales Dominant Side:  
      [facility] H2 Model Falls Risk Assessment Risk Factors: 
     (5)  History of Recent Falls [w/in 3 months] Ability to Rise from Chair: 
     (0)  Ability to rise in a single movement Falls Prevention Plan: 
     Exercise/Equipment Adaptation (specify):  incorporate balance exercises as needed Total: (5 or greater = High Risk): 5  
©2010 [facility] States Patent #[id number]. Federal Law prohibits the replication, distribution or use without written permission from [facility] [employer] Current Medications:   
  
Current Outpatient Medications:  
  aspirin (ASPIRIN) 325 mg tablet, Take 325 mg by mouth two (2) times daily as needed for Pain. Hold for surgery- 5/4/21  Pt states last dose was 5/4/21 at Noon, Disp: , Rfl:  
  ALPRAZolam (Xanax) 0.25 mg tablet, Take 0.25 mg by mouth two (2) times daily as needed for Anxiety. , Disp: , Rfl:   
Date Last Reviewed:  6/7/2021 Number of Personal Factors/Comorbidities that affect the Plan of Care: 1-2: MODERATE COMPLEXITY EXAMINATION:  
Palpation:   
      Scar healed over the right elbow. Mild protrusion noted at left patella region ROM:  
   
 R elbow  ° 
R knee 0-145 ° Full forearm pronation and supination. Full wrist and hand ROM L elbow 0-150 ° L knee 0-143 ° Strength:  
  R  strength testing 11# L  strength testing 29# Special Tests: na 
Neurological Screen: pt does report some intermittent numbness/tingling at times into the right fingers Balance:  no assistive device required. She demonstrates good balance in the clinic Body Structures Involved: 1. Nerves 2. Bones 3. Joints 4. Muscles 5. Ligaments Body Functions Affected: 1. Sensory/Pain 2. Neuromusculoskeletal 
3. Movement Related Activities and Participation Affected: 1. General Tasks and Demands 2. Mobility 3. Self Care 4. Domestic Life 5. Interpersonal Interactions and Relationships 6. Community, Social and [place] Number of elements (examined above) that affect the Plan of Care: 4+: HIGH COMPLEXITY CLINICAL PRESENTATION:  
Presentation: Stable and uncomplicated: LOW COMPLEXITY CLINICAL DECISION MAKING:  
Use of outcome tool(s) and clinical judgement create a POC that gives a: Clear prediction of patient's progress: LOW COMPLEXITY

## 2021-06-07 NOTE — PROGRESS NOTES
Zoe Counts  : 1940  Payor: Pascual Carr / Plan: Lincoln Keating / Product Type: Managed Care Medicare /  [address]  at [facility] Therapy  [address], [address]  [phone]   [phone]      OUTPATIENT PHYSICAL THERAPY: Daily Treatment Note 2021  Visit Count:  1    ICD-10: Treatment Diagnosis: Closed fracture of olecranon process of right ulna with routine healing, subsequent encounter [S52.021D], pain in right elbow M25.521, pain in left knee M25.562  Precautions/Allergies:   Amoxicillin-pot clavulanate, Ciprofloxacin, Compazine [prochlorperazine], and Prochlorperazine edisylate   TREATMENT PLAN:  Effective Dates: 2021 TO 2021 (90 days). Frequency/Duration: 2 times a week for 90 Day(s) MEDICAL/REFERRING DIAGNOSIS:  Closed fracture of olecranon process of right ulna with routine healing, subsequent encounter [S52.021D]   DATE OF ONSET: May 1  REFERRING PHYSICIAN: Destinee Tejada MD MD Orders: Eval and treat-A/PROM and limited UE strengthening to 10#  Return MD Appointment: next several weeks     Pre-treatment Symptoms/Complaints:  Pt reports feeling mild soreness in the elbow. Pain: Initial: Pain Intensity 1: 8  Post Session:  7/10   Medications Last Reviewed:  2021  Updated Objective Findings:  See evaluation note from today   TREATMENT:   THERAPEUTIC EXERCISE: (15 minutes):  Exercises per grid below to improve mobility and strength. Required minimal verbal cues to promote proper body mechanics. Progressed resistance, range and repetitions as indicated.   Standing wand elbow flex/extension x 10  Pulleys x 10  SLR x 10  Quad sets x 5  Step ups x 5   Mini squats x 10  Manual Therapy ( 10 min     ): combined STM along with passive range to elbow for improved flexion and extension    Therapeutic Modalities ( na    ):na    Evaluation (x)    HEP: As above; handouts given to patient for all exercises. Treatment/Session Summary:    · Response to Treatment:  Daughter present with treatment today. Pt instructed in HEP and was able to demonstrate proper exercises. She reported having mild soreness with passive elbow motions. .  · Communication/Consultation:  None today  · Equipment provided today:  None today  · Recommendations/Intent for next treatment session: Next visit will focus on continued ROM, manual therapy and light strengthening of R UE and L LE. Laurel Anthony   Total Treatment Billable Duration:  Eval plus 25 min  PT Patient Time In/Time Out  Time In: 1015  Time Out: [facility],     Future Appointments   Date Time Provider Austen Garibay   6/10/2021 11:45 AM Jackson Craig, PT [facility]   6/14/2021 10:15 AM Jackson Craig, PT [facility]   6/17/2021 11:00 AM Jackson Craig, PT [facility]   6/21/2021 10:15 AM Jackson Craig, PT [facility]   6/24/2021  9:30 AM Destinee Tejada MD [facility]   6/25/2021 11:00 AM Jackson Craig, PT [facility]

## 2021-06-10 ENCOUNTER — HOSPITAL ENCOUNTER (OUTPATIENT)
Dept: PHYSICAL THERAPY | Age: 81
Discharge: HOME | End: 2021-06-10
Payer: MEDICARE

## 2021-06-10 PROCEDURE — 97110 THERAPEUTIC EXERCISES: CPT

## 2021-06-10 PROCEDURE — 97140 MANUAL THERAPY 1/> REGIONS: CPT

## 2021-06-10 NOTE — PROGRESS NOTES
Jermain Albarran  : 1940  Payor: Danie Hamptonjose raul / Plan: Mark Sanon / Product Type: Managed Care Medicare /  [address]  at [facility] Therapy  [address], [address]  [phone]   [phone]      OUTPATIENT PHYSICAL THERAPY: Daily Treatment Note 6/10/2021  Visit Count:  2    ICD-10: Treatment Diagnosis: Closed fracture of olecranon process of right ulna with routine healing, subsequent encounter [S52.021D], pain in right elbow M25.521, pain in left knee M25.562  Precautions/Allergies:   Amoxicillin-pot clavulanate, Ciprofloxacin, Compazine [prochlorperazine], and Prochlorperazine edisylate   TREATMENT PLAN:  Effective Dates: 2021 TO 2021 (90 days). Frequency/Duration: 2 times a week for 90 Day(s) MEDICAL/REFERRING DIAGNOSIS:  Displaced fracture of olecranon process without intraarticular extension of right ulna, subsequent encounter for closed fracture with routine healing [S52.021D]   DATE OF ONSET: May 1  REFERRING PHYSICIAN: Ruby Joy MD MD Orders: Eval and treat-A/PROM and limited UE strengthening to 10#  Return MD Appointment: next several weeks     Pre-treatment Symptoms/Complaints:  Pt reports she has been able to use the arm more for her ADL's. Pain: Initial: Pain Intensity 1: 6  Post Session:  6/10   Medications Last Reviewed:  6/10/2021  Updated Objective Findings:  4-125 degrees of elbow motion today   TREATMENT:   THERAPEUTIC EXERCISE: (30 minutes):  Exercises per grid below to improve mobility and strength. Required minimal verbal cues to promote proper body mechanics. Progressed resistance, range and repetitions as indicated.   Standing wand elbow flex/extension x 10-held  Nu-step x 8 min  Standing biceps curls with eccentric emphasis 1# x 25  Pron/sup 1# x 30  Wrist flexion 1# x 20  Wrist extension 1# x 20  Putty squeezed x 30  Wall washing x 15  Standing rows yellow TB x 20  Standing punches yellow TB x 20    Manual Therapy ( 15 min     ): combined STM along with passive range to elbow for improved flexion and extension    Therapeutic Modalities ( na    ):na      HEP: continue as directed    Treatment/Session Summary:    · Response to Treatment:  Pt achieving significant increase in elbow motion since last treatment. She has been able to use the arm for more ADL's like dressing, bathing and eating. She can now reach her opposite shoulder with the right hand. Still limited with reaching the back of her head. · Communication/Consultation:  None today  · Equipment provided today:  None today  · Recommendations/Intent for next treatment session: Next visit will focus on continued ROM, manual therapy and light strengthening of R UE and L LE. Vonzella Goodpasture   Total Treatment Billable Duration:  45 min  PT Patient Time In/Time Out  Time In: 2102  Time Out: [address] PT    Future Appointments   Date Time Provider Austen Garibay   6/14/2021 10:15 AM Sofia Steele PT [facility]   6/17/2021 11:00 AM Sofia Steele PT [facility]   6/21/2021 10:15 AM Sofia Steele PT [facility]   6/24/2021  9:30 AM Loli Maldonado MD [facility]   6/25/2021 11:00 AM Sofia Steele PT [facility]

## 2021-06-14 ENCOUNTER — HOSPITAL ENCOUNTER (OUTPATIENT)
Dept: PHYSICAL THERAPY | Age: 81
Discharge: HOME | End: 2021-06-14
Payer: MEDICARE

## 2021-06-14 PROCEDURE — 97110 THERAPEUTIC EXERCISES: CPT

## 2021-06-14 PROCEDURE — 97140 MANUAL THERAPY 1/> REGIONS: CPT

## 2021-06-14 NOTE — PROGRESS NOTES
Lorena Bridges  : 1940  Payor: Tana Brewer / Plan: Matthias Galaviz / Product Type: Managed Care Medicare /  [address]  at [facility] Therapy  [address] [facility], [address]  [phone]   [phone]      OUTPATIENT PHYSICAL THERAPY: Daily Treatment Note 2021  Visit Count:  3    ICD-10: Treatment Diagnosis: Closed fracture of olecranon process of right ulna with routine healing, subsequent encounter [S52.021D], pain in right elbow M25.521, pain in left knee M25.562  Precautions/Allergies:   Amoxicillin-pot clavulanate, Ciprofloxacin, Compazine [prochlorperazine], and Prochlorperazine edisylate   TREATMENT PLAN:  Effective Dates: 2021 TO 2021 (90 days). Frequency/Duration: 2 times a week for 90 Day(s) MEDICAL/REFERRING DIAGNOSIS:  Displaced fracture of olecranon process without intraarticular extension of right ulna, subsequent encounter for closed fracture with routine healing [S52.021D]   DATE OF ONSET: May 1  REFERRING PHYSICIAN: Vince Russo MD MD Orders: Robyn Araujo and treat-A/PROM and limited UE strengthening to 10#  Return MD Appointment: next several weeks     Pre-treatment Symptoms/Complaints:  Pt reported feeling good on Thursday and for part of Friday after last treatment. She did do laundry and work on lap top on Friday, but her pain has been worse sine then. Pain is located in upper arm and into shoulder region. Some tingling into hand. Pain: Initial: Pain Intensity 1: 9  Post Session: 8/10   Medications Last Reviewed:  2021  Updated Objective Findings:  4-125 degrees of elbow motion today   TREATMENT:   THERAPEUTIC EXERCISE: (30 minutes):  Exercises per grid below to improve mobility and strength. Required minimal verbal cues to promote proper body mechanics. Progressed resistance, range and repetitions as indicated.   Standing wand elbow flex/extension x 25  Nu-step x 8 min  Standing biceps curls with eccentric emphasis 1# x 25  Pron/sup 1# x 30  Wrist flexion 1# x 25  Wrist extension 1# x 25  Putty squeezed x 30-held  Wall washing x 15  Standing rows yellow TB x 20  Standing punches yellow TB x 20    Manual Therapy ( 15 min     ): combined STM along with passive range to elbow for improved flexion and extension    Therapeutic Modalities (10 min    ): MHP x 10 minutes to shoulder and elbow to reduce pain. Pt in sitting      HEP: continue as directed    Treatment/Session Summary:    · Response to Treatment:  Pt reporting feeling increased pain at times with today's treatment. Manual therapy did help some. She was encouraged to watch her pain levels and not let them get too high and rest and use heat if needed. · Communication/Consultation:  None today  · Equipment provided today:  None today  · Recommendations/Intent for next treatment session: Next visit will focus on continued ROM, manual therapy and light strengthening of R UE and L LE. Maribel Tam   Total Treatment Billable Duration:    PT Patient Time In/Time Out  Time In: 1015  Time Out: 501 [facility], PT    Future Appointments   Date Time Provider Austen Garibay   6/17/2021 11:00 AM oLrna Dominguez, [facility]   6/21/2021 10:15 AM Lorna [facility]uez, [facility]   6/24/2021  9:30 AM Miranda Skelton MD [facility]   6/25/2021 11:00 AM [facility]

## 2021-06-18 ENCOUNTER — HOSPITAL ENCOUNTER (OUTPATIENT)
Dept: PHYSICAL THERAPY | Age: 81
Discharge: HOME | End: 2021-06-18
Payer: MEDICARE

## 2021-06-18 PROCEDURE — 97110 THERAPEUTIC EXERCISES: CPT

## 2021-06-18 PROCEDURE — 97140 MANUAL THERAPY 1/> REGIONS: CPT

## 2021-06-18 NOTE — PROGRESS NOTES
Jovan Patella  : 1940  Payor: Odette Lombardo / Plan: [employer] / Product Type: Managed Care Medicare /  Ana Barrow at [facility]  [address], [address]  [phone]   [phone]      OUTPATIENT PHYSICAL THERAPY: Daily Treatment Note 2021  Visit Count:  4    ICD-10: Treatment Diagnosis: Closed fracture of olecranon process of right ulna with routine healing, subsequent encounter [S52.021D], pain in right elbow M25.521, pain in left knee M25.562  Precautions/Allergies:   Amoxicillin-pot clavulanate, Ciprofloxacin, Compazine [prochlorperazine], and Prochlorperazine edisylate   TREATMENT PLAN:  Effective Dates: 2021 TO 2021 (90 days). Frequency/Duration: 2 times a week for 90 Day(s) MEDICAL/REFERRING DIAGNOSIS:  Displaced fracture of olecranon process without intraarticular extension of right ulna, subsequent encounter for closed fracture with routine healing [S52.021D]   DATE OF ONSET: May 1  REFERRING PHYSICIAN: Claudia Montoya MD MD Orders: Eval and treat-A/PROM and limited UE strengthening to 10#  Return MD Appointment: next several weeks     Pre-treatment Symptoms/Complaints:  Pt reported feeling better after last treatment. The heat did help and she is using her heating pad at home. Pain: Initial: Pain Intensity 1: 8  Post Session: 7/10   Medications Last Reviewed:  2021  Updated Objective Findings:  supine elbow flexion 140 today   TREATMENT:   THERAPEUTIC EXERCISE: (29 minutes):  Exercises per grid below to improve mobility and strength. Required minimal verbal cues to promote proper body mechanics. Progressed resistance, range and repetitions as indicated.   Standing wand elbow flex/extension x 25  Nu-step x 8 min  Standing biceps curls with eccentric emphasis 1# x 25  Pron/sup 1# x 30  Wrist flexion 1# x 30  Wrist extension 1# x 30  Putty squeezed x 30-held  Wall washing x 15-held  Standing rows yellow TB x 20  Standing punches yellow TB x 20  Hammer curls 1# 2 x 10  Supine elbow flexion 2 x 10  Supine flexion -shoulder with tennis ball 2 x 8  Supine punches with tennis ball 2 x 10  Seated scap retract with ER yellow x 20  Green wrist exerciser-palms up/down x 20 each  Manual Therapy ( 15 min     ): combined STM along with passive range to elbow for improved flexion and extension. Pt in supine    Therapeutic Modalities (0 min    ): na    HEP: continue as directed    Treatment/Session Summary:    · Response to Treatment:  Pt reporting feeling better post treatment. The elbow does feel stiff while she attempts ot use it at home. Gaining flexion and extension at the elbow. She can reach back of her head now. · Communication/Consultation:  None today  · Equipment provided today:  None today  · Recommendations/Intent for next treatment session: Next visit will focus on continued ROM, manual therapy and light strengthening of R UE and L LEJim Herrera   Total Treatment Billable Duration:  44 min  PT Patient Time In/Time Out  Time In: 1100  Time Out: [address] [facility], PT    Future Appointments   Date Time Provider Austen Garibay   6/21/2021 10:15 AM CAS Miguel   6/24/2021  9:30 AM Ruby Joy MD [facility]   6/25/2021 11:00 [facility]

## 2021-06-21 ENCOUNTER — HOSPITAL ENCOUNTER (OUTPATIENT)
Dept: PHYSICAL THERAPY | Age: 81
Discharge: HOME | End: 2021-06-21
Payer: MEDICARE

## 2021-06-21 PROCEDURE — 97140 MANUAL THERAPY 1/> REGIONS: CPT

## 2021-06-21 PROCEDURE — 97110 THERAPEUTIC EXERCISES: CPT

## 2021-06-21 NOTE — PROGRESS NOTES
Saloni Warren  : 1940  Payor: Ed Castle / Plan: Fina Abler / Product Type: Managed Care Medicare /  [address]  at [facility]  [address], [address]  [phone]   [phone]      OUTPATIENT PHYSICAL THERAPY: Daily Treatment Note 2021  Visit Count:  5    ICD-10: Treatment Diagnosis: Closed fracture of olecranon process of right ulna with routine healing, subsequent encounter [S52.021D], pain in right elbow M25.521, pain in left knee M25.562  Precautions/Allergies:   Amoxicillin-pot clavulanate, Ciprofloxacin, Compazine [prochlorperazine], and Prochlorperazine edisylate   TREATMENT PLAN:  Effective Dates: 2021 TO 2021 (90 days). Frequency/Duration: 2 times a week for 90 Day(s) MEDICAL/REFERRING DIAGNOSIS:  Displaced fracture of olecranon process without intraarticular extension of right ulna, subsequent encounter for closed fracture with routine healing [S52.021D]   DATE OF ONSET: May 1  REFERRING PHYSICIAN: Bhargavi Stearns MD MD Orders: Eval and treat-A/PROM and limited UE strengthening to 10#  Return MD Appointment: next several weeks     Pre-treatment Symptoms/Complaints:  Pt reports feeling better. Less pain in the elbow. Mild soreness in the wrist  Pain: Initial: Pain Intensity 1: 1  Post Session: 1/10   Medications Last Reviewed:  2021  Updated Objective Findings:  0-140 degrees   TREATMENT:   THERAPEUTIC EXERCISE: (29 minutes):  Exercises per grid below to improve mobility and strength. Required minimal verbal cues to promote proper body mechanics. Progressed resistance, range and repetitions as indicated.   Standing wand elbow flex/extension x 25-held  Nu-step x 6 min  Standing biceps curls with eccentric emphasis 1# x 25  Pron/sup 1# x 30  Wrist flexion 1# x 30  Wrist extension 1# x 30  Putty squeezed x 30-held  Wall washing x 15-held  Standing rows yellow TB x 20  Standing punches yellow TB x 20  Standing yellow triceps press x 20  Hammer curls 1# 2 x 10  Supine elbow flexion 2 x 10  Supine flexion -shoulder with tennis ball 2 x 8-held  Supine punches 1# 2 x 10  Seated scap retract with ER yellow x 20  Green wrist exerciser-palms up/down x 20 each  Manual Therapy ( 15 min     ): combined STM along with passive range to elbow for improved flexion and extension. Pt in supine    Therapeutic Modalities (0 min    ): na    HEP: continue as directed    Treatment/Session Summary:    · Response to Treatment:  Pt reporting feeling better. Less overall pain in the arm and elbow. She has reached 0-140 degrees of elbow motion and can now eat, dress, bathe and carry lightweight objects in the right hand. Handwriting has also improved. .  · Communication/Consultation:  None today  · Equipment provided today:  None today  · Recommendations/Intent for next treatment session: Next visit will focus on continued ROM, manual therapy and light strengthening of R UE and L LE. Leonie Goetz   Total Treatment Billable Duration:  44 min  PT Patient Time In/Time Out  Time In: 1015  Time Out: [address] [place] [address],     Future Appointments   Date Time Provider \[facility]\""   6/24/2021  9:30 AM Kathy Grimm MD [facility]   6/25/2021  7:00 PM Jane Plaster, PT [facility]   7/2/2021 10:15 AM Jane Plaster, PT [facility]   7/6/2021 11:45 AM Jane Plaster, PT [facility]   7/8/2021 10:15 AM Jane Plaster, PT [facility]   7/12/2021 10:15 AM Jane Plaster, PT [facility]   7/15/2021 10:15 AM Jane Plaster, PT [facility]   7/19/2021 10:15 AM Jane Plaster, PT [facility]   7/22/2021 10:15 AM Jane Plaster, PT [facility]   7/26/2021 10:15 AM Jane Plaster, PT [facility]   7/29/2021 10:15 AM Jane Plaster, PT [facility]

## 2021-06-25 ENCOUNTER — APPOINTMENT (OUTPATIENT)
Dept: PHYSICAL THERAPY | Age: 81
End: 2021-06-25
Payer: MEDICARE

## 2021-07-02 ENCOUNTER — APPOINTMENT (OUTPATIENT)
Dept: PHYSICAL THERAPY | Age: 81
End: 2021-07-02
Payer: MEDICARE

## 2021-07-06 ENCOUNTER — HOSPITAL ENCOUNTER (OUTPATIENT)
Dept: PHYSICAL THERAPY | Age: 81
Discharge: HOME | End: 2021-07-06
Payer: MEDICARE

## 2021-07-06 PROCEDURE — 97140 MANUAL THERAPY 1/> REGIONS: CPT

## 2021-07-06 PROCEDURE — 97110 THERAPEUTIC EXERCISES: CPT

## 2021-07-06 NOTE — THERAPY DISCHARGE
Silverio Kaminski  : 1940  Payor: Alesia Poole / Plan: Ramos Luciano / Product Type: Managed Care Medicare /  [address]  at [facility]  [address], [address]  [phone]   [phone]         OUTPATIENT PHYSICAL THERAPY:Discharge Summary 2021   ICD-10: Treatment Diagnosis: pain in right elbow M25.521, pain in left knee M25.562, Closed fracture of olecranon process of right ulna with routine healing, subsequent encounter [S52.021D]  Precautions/Allergies:   Amoxicillin-pot clavulanate, Ciprofloxacin, Compazine [prochlorperazine], and Prochlorperazine edisylate   TREATMENT PLAN:  Effective Dates: 2021 TO 2021 (90 days). Frequency/Duration: 2 times a week for 90 Day(s) and upon reassessment, will adjust frequency and duration as progress indicates. MEDICAL/REFERRING DIAGNOSIS:  Displaced fracture of olecranon process without intraarticular extension of right ulna, subsequent encounter for closed fracture with routine healing [S52.021D]   DATE OF ONSET: fall May 1st  REFERRING PHYSICIAN: Joe Tidwell MD MD Orders: Pako Nunes and treat  Return MD Appointment: in next several weeks     INITIAL ASSESSMENT:  Ms. Viktoriya Giraldo presents with loss of right elbow motion and strength due to recent fracture of olecranon followed by closed reduction and fixation. Pt also fractured left patella and she was treated nonoperatively. Pt reports her biggest complaint is the elbow and she is limited in full dressing, ADL's and hobbies. She would like to improve her overall elbow motion and strength so she can use the arm normally without restrictions. Discharge Assessment 2021:  Pt reporting some mild stiffness in the elbow and soreness due to packing, but otherwise she feels ready for discharge. She has 0-138 ° of motion today with 4-/5 strength.   It is still difficult to lift objects at times and place them in cabinets or refrigerator. She is using her non-dominant hand for some heavier tasks. She has been able to meet all therapy goals and is ready for discharge. Updated HEP provided to patient with all questions answered. PROBLEM LIST (Impacting functional limitations):  1. Decreased Strength  2. Decreased ADL/Functional Activities  3. Increased Pain  4. Decreased Activity Tolerance  5. Decreased Flexibility/Joint Mobility INTERVENTIONS PLANNED: (Treatment may consist of any combination of the following)  1. Heat  2. Home Exercise Program (HEP)  3. Manual Therapy  4. Range of Motion (ROM)  5. Therapeutic Activites  6. Therapeutic Exercise/Strengthening     GOALS: (Goals have been discussed and agreed upon with patient.)  Short-Term Functional Goals: Time Frame: 6 weeks  1. Establish independent HEP with no cuing.-met  2. Pt will be able to report independence with dressing and bathing.-met  3. Pt will be able to use the right UE and hand for eating, meal preparations. -met  4. Pt will be able to use R UE to push or pull open car doors and house/office doors. -met  Discharge Goals: Time Frame: 12 weeks  1. Pt will be able to improve score on Cambridge scale by at least 5 points for advanced ADL's.-met  2. Pt will be able to gain 5-10 degrees of elbow motion needed for hair styling.-met  3. Pt will be able to gain 1/2 grade increase in strength needed to lift and carry cookware, dinner plates, purse.-met  4. Pt will be able to report climbing steps reciprocally. -met    OUTCOME MEASURE:   Tool Used: Lower Extremity Functional Scale (LEFS)  Score:  Initial: 34/80 Most Recent: X/80 (Date: -- )   Interpretation of Score: 20 questions each scored on a 5 point scale with 0 representing \"extreme difficulty or unable to perform\" and 4 representing \"no difficulty\". The lower the score, the greater the functional disability. 80/80 represents no disability. Minimal detectable change is 9 points.     OUTCOME: SENDY Shoulder Score  Initial Score: 25/100 Most Recent:80/100 (Date: 7/6/2021)   Interpretation of Score: 20 questions each scored on a 3 point scale with 0 representing \"extreme difficulty or unable to perform\" and 3 representing \"no difficulty\", 3 questions each scored from 0-10 about pain, and 1 question scored 0-10 about function of the shoulder  The lower the score, the greater the functional disability. 100/100 represents no disability, pain or loss of function. Minimal clinically important difference is 11.4. Minimal detectable change is 12.1. Rehabilitation Potential For Stated Goals: Good  Regarding Tarun Irby's therapy, I certify that the treatment plan above will be carried out by a therapist or under their direction.   Thank you for this referral,  Alyssa Stringer, PT

## 2021-07-06 NOTE — PROGRESS NOTES
Macario Mohs  : 1940  Payor: Rod Parker / Plan: Khalida Ley / Product Type: Managed Care Medicare /  [address]  at [facility]  [address], [address]  [phone]   [phone]      OUTPATIENT PHYSICAL THERAPY: Daily Treatment Note 2021  Visit Count:  6    ICD-10: Treatment Diagnosis: Closed fracture of olecranon process of right ulna with routine healing, subsequent encounter [S52.021D], pain in right elbow M25.521, pain in left knee M25.562  Precautions/Allergies:   Amoxicillin-pot clavulanate, Ciprofloxacin, Compazine [prochlorperazine], and Prochlorperazine edisylate   TREATMENT PLAN:  Effective Dates: 2021 TO 2021 (90 days). Frequency/Duration: 2 times a week for 90 Day(s) MEDICAL/REFERRING DIAGNOSIS:  Displaced fracture of olecranon process without intraarticular extension of right ulna, subsequent encounter for closed fracture with routine healing [S52.021D]   DATE OF ONSET: May 1  REFERRING PHYSICIAN: Yolanda Staples MD MD Orders: Eval and treat-A/PROM and limited UE strengthening to 10#  Return MD Appointment: next several weeks     Pre-treatment Symptoms/Complaints:  Pt reports having a good report from MD.  He has recommended she does not need additional therapy. Pt feels the same. She does report soreness from packing up her condo. Pain: Initial: Pain Intensity 1: 2  Post Session: 1/10   Medications Last Reviewed:  2021  Updated Objective Findings:  0-140 degrees   TREATMENT:   THERAPEUTIC EXERCISE: (29 minutes):  Exercises per grid below to improve mobility and strength. Required minimal verbal cues to promote proper body mechanics. Progressed resistance, range and repetitions as indicated.   Standing wand elbow flex/extension x 25-held  Nu-step x 6 min  Standing biceps curls with eccentric emphasis 1# x 25  Pron/sup 1# x 30  Wrist flexion 1# x 30  Wrist extension 1# x 30  Standing rows red TB x 20  Standing punches redTB x 20  Standing yellow triceps press x 20  Bent over triceps press 1# x 20  Hammer curls 1# 2 x 10  Supine elbow flexion 2 x 10 1#  Supine punches 2# 2 x 10  Seated scap retract with ER yellow x 20    Manual Therapy ( 15 min     ): combined STM along with passive range to elbow for improved flexion and extension. Pt in supine    Therapeutic Modalities (0 min    ): na    HEP: continue as directed    Treatment/Session Summary:    · Response to Treatment:  Pt seen for last appointment today due to meetin goals. She has 0-138 degrees of motion today with 4-/5 strength. She reports the most difficulty with lifting objects in the home, but can use other arm to perform these tasks. She is pleased with her progress. Will need to continue working on exercises at home.   .  · Communication/Consultation:  None today  · Equipment provided today:  red theraband  Total Treatment Billable Duration:  44 min  PT Patient Time In/Time Out  Time In: 1145  Time Out: [address] Yay[address] Peace, PT    Future Appointments   Date Time Provider Austen Lani   8/10/2021 11:00 AM Sharla Castro MD [facility]

## 2021-07-08 ENCOUNTER — APPOINTMENT (OUTPATIENT)
Dept: PHYSICAL THERAPY | Age: 81
End: 2021-07-08
Payer: MEDICARE

## 2021-07-12 ENCOUNTER — APPOINTMENT (OUTPATIENT)
Dept: PHYSICAL THERAPY | Age: 81
End: 2021-07-12
Payer: MEDICARE

## 2021-07-15 ENCOUNTER — APPOINTMENT (OUTPATIENT)
Dept: PHYSICAL THERAPY | Age: 81
End: 2021-07-15
Payer: MEDICARE

## 2021-07-19 ENCOUNTER — APPOINTMENT (OUTPATIENT)
Dept: PHYSICAL THERAPY | Age: 81
End: 2021-07-19
Payer: MEDICARE

## 2021-07-22 ENCOUNTER — APPOINTMENT (OUTPATIENT)
Dept: PHYSICAL THERAPY | Age: 81
End: 2021-07-22
Payer: MEDICARE

## 2021-07-26 ENCOUNTER — APPOINTMENT (OUTPATIENT)
Dept: PHYSICAL THERAPY | Age: 81
End: 2021-07-26
Payer: MEDICARE

## 2021-07-29 ENCOUNTER — APPOINTMENT (OUTPATIENT)
Dept: PHYSICAL THERAPY | Age: 81
End: 2021-07-29
Payer: MEDICARE

## 2022-03-29 ENCOUNTER — HOSPITAL ENCOUNTER (OUTPATIENT)
Dept: PHYSICAL THERAPY | Age: 82
Discharge: HOME | End: 2022-03-29
Payer: MEDICARE

## 2022-03-29 DIAGNOSIS — M25.561 ACUTE PAIN OF RIGHT KNEE: ICD-10-CM

## 2022-03-29 DIAGNOSIS — M17.11 OSTEOARTHRITIS OF RIGHT KNEE, UNSPECIFIED OSTEOARTHRITIS TYPE: ICD-10-CM

## 2022-03-29 PROCEDURE — 97110 THERAPEUTIC EXERCISES: CPT

## 2022-03-29 PROCEDURE — 97161 PT EVAL LOW COMPLEX 20 MIN: CPT

## 2022-03-29 NOTE — PROGRESS NOTES
Aneudy Packer  : 1940  Payor: Maykel Albarran / Plan: Joan Haynes / Product Type: Managed Care Medicare /  [address]  at [facility]  [address], [address]  [phone]   [phone]      OUTPATIENT PHYSICAL THERAPY: Daily Treatment Note 3/29/2022  Visit Count:  1    ICD-10: Treatment Diagnosis: Acute pain of right knee [M25.561]  Osteoarthritis of right knee, unspecified osteoarthritis type [M17.11]  Precautions/Allergies:   Amoxicillin-pot clavulanate, Ciprofloxacin, Compazine [prochlorperazine], and Prochlorperazine edisylate   TREATMENT PLAN:  Effective Dates: 3/29/2022 TO 2022 (30 days). Frequency/Duration: 1 time a week for 30 Day(s) MEDICAL/REFERRING DIAGNOSIS:  Acute pain of right knee [M25.561]  Osteoarthritis of right knee, unspecified osteoarthritis type [M17.11]   DATE OF ONSET: several weeks ago  REFERRING PHYSICIAN: VANE Chase MD Orders: Karen Rosa and treat  Return MD Appointment: as needed     Pre-treatment Symptoms/Complaints:  Pt reports feeling pain in shin and down the back of the leg at times- behind the knee. Pain: Initial: Pain Intensity 1: 5  Post Session:  5/10   Medications Last Reviewed:  3/29/2022  Updated Objective Findings:  See evaluation note from today   TREATMENT:   THERAPEUTIC EXERCISE: (10 minutes):  Exercises per grid below to improve mobility and strength. Required minimal verbal cues to promote proper body mechanics. Progressed resistance, range and repetitions as indicated. SLR x 5  Bridging x 3  SAQ x 5  LAQ blue TB x 5  Step ups x 1  Hamstring stretching  Manual Therapy (   na   ): na    Therapeutic Modalities ( na    ):na    Evaluation (x)    HEP: As above; handouts given to patient for all exercises.     Treatment/Session Summary:    · Response to Treatment:  Pt seen for eval.  She believes she can perform exercises at home and only return if syptoms persist or worsen. · Communication/Consultation:  None today  · Equipment provided today:  None today  · Recommendations/Intent for next treatment session: Next visit will focus on re-evaluation of progress/HEP. Total Treatment Billable Duration:  Eval plus TE  PT Patient Time In/Time Out  Time In: 4314  Time Out: [address] [facility]    No future appointments.

## 2022-03-29 NOTE — THERAPY EVALUATION
Zo Nobles  : 1940  Payor: Jalen Hanson / Plan: Kevin Luh / Product Type: Managed Care Medicare /  [address]  at [facility]  [address], [address]  [phone]   [phone]         OUTPATIENT PHYSICAL THERAPY:Initial Assessment 3/29/2022   ICD-10: Treatment Diagnosis: Acute pain of right knee [M25.561]  Osteoarthritis of right knee, unspecified osteoarthritis type [M17.11]  Precautions/Allergies:   Amoxicillin-pot clavulanate, Ciprofloxacin, Compazine [prochlorperazine], and Prochlorperazine edisylate   TREATMENT PLAN:  Effective Dates: 3/29/2022 TO 2022 (30 days). Frequency/Duration: 1 time a week for 30 Day(s) and upon reassessment, will adjust frequency and duration as progress indicates. MEDICAL/REFERRING DIAGNOSIS:  Acute pain of right knee [M25.561]  Osteoarthritis of right knee, unspecified osteoarthritis type [M17.11]   DATE OF ONSET: several weeks ago  REFERRING PHYSICIAN: VANE Shannon MD Orders: Hortensia Torres and treat  Return MD Appointment: as needed     INITIAL ASSESSMENT:  Ms. Jemima Sanchez presents with increased right knee pain after she twisted her knee at home. She reports some swelling in the back with pain at times down the back and front of the knee and difficulty with bending at times. Negative x-rays except for some OA. She would like to learn exercises to best care for the knee. PROBLEM LIST (Impacting functional limitations):  1. Decreased Strength  2. Increased Pain  3. Decreased Flexibility/Joint Mobility  4. Edema/Girth  5. Decreased [place] with Home Exercise Program INTERVENTIONS PLANNED: (Treatment may consist of any combination of the following)  1. Home Exercise Program (HEP)  2. Manual Therapy  3. Range of Motion (ROM)  4.  Therapeutic Exercise/Strengthening     GOALS: (Goals have been discussed and agreed upon with patient.)  Short-Term Functional Goals: Time Frame: 2 weeks  1. Establish independent HEP with no cuing. 2. Pt will be able to report 5/10 pain rating or less with ADL's.  3. Pt will be able to continue to perform normal home tasks. Discharge Goals: Time Frame: 4 weeks  1. Pt will be able to improve score on Koos by at least 2-4 points for advanced ADL's.  2. Pt will be able to increase strength by 1/2 grade for  Improved stair/step negotiation  3. Pt will be able to report no pain with rising to standing from chair. OUTCOME MEASURE:   Tool Used: Knee Injury and Osteoarthritis Outcome Form (KOOS-JR.)  SCORE: None (0) Mild (1) Moderate (2) Severe (3) Extreme (4)   Stiffness:         1. How severe is your knee stiffness after first waking in the morning? [] [] [x] [] []   Pain:         What amount of knee pain have you experienced in the last week   doing the following activities? 2. Twisting/pivoting on your knee: [] [] [x] [] []   3. Straightening knee fully: [] [x] [] [] []   4. Going up or down stairs [] [] [x] [] []   5. Standing upright [] [] [x] [] []   Function, daily living        6. Rising from sitting [] [] [x] [] []   7. Bending to floor/ an object [] [] [x] [] []     Score:  Initial: 13 (Interval: 54.840) 3/29/2022/28 Most Recent: TBD/28 (Date: -- )   Interpretation of Score: Questions each scored on a 4 point scale with 4 representing the worst possible score and 0 representing the best possible score. The higher the point total, the worse the knee pain translating to a lower percentage of patient functioning. MEDICAL NECESSITY:   · Patient is expected to demonstrate progress in strength and range of motion to increase independence with all walking and ADL's/hobbies. REASON FOR SERVICES/OTHER COMMENTS:  · Patient continues to require skilled intervention due to slight decrease in ROM and strength with mild edema.   Total Duration:  PT Patient Time In/Time Out  Time In: 1240  Time Out: 0125    Rehabilitation Potential For Stated Goals: Good  Regarding Jeni Irby's therapy, I certify that the treatment plan above will be carried out by a therapist or under their direction. Thank you for this referral,  Shanda Peralta, PT     Referring Physician Signature: VANE Pozo _______________________________ Date _____________                        PAIN/SUBJECTIVE:   Initial: Pain Intensity 1: 5  Post Session:  5/10   HISTORY:   History of Injury/Illness (Reason for Referral):  Pt reported twisting knee at home and she had had posterior knee pain along with mild edema and loss of motion. X-rays negative. She reports some pain down the shin as well. She would like to learn exercises to help care for the knee. She is avoiding any type of surgical intervention of injections if possible. Past Medical History/Comorbidities:   Ms. Radha Santana  has a past medical history of Anxiety and Pure hypercholesterolemia. Ms. Radha Santana  has no past surgical history on file. right elbow fracture post ORIF  Social History/Living Environment:     pt lives independently- family nearby  Prior Level of Function/Work/Activity:  Pt is retired     Ambulatory/Rehab [address] [place] [address] Risk Assessment   Risk Factors:       No Risk Factors Identified Ability to Rise from Chair:       (0)  Ability to rise in a single movement   Falls Prevention Plan:       No modifications necessary   Total: (5 or greater = High Risk): 0   ©2010 [facility] [address] [place] [address] States Patent #[id number]. Federal Law prohibits the replication, distribution or use without written permission from [facility] [employer]   Current Medications:       Current Outpatient Medications:     aspirin (ASPIRIN) 325 mg tablet, Take 325 mg by mouth two (2) times daily as needed for Pain.  Hold for surgery- 5/4/21  Pt states last dose was 5/4/21 at Noon, Disp: , Rfl:     ALPRAZolam (Xanax) 0.25 mg tablet, Take 0.25 mg by mouth two (2) times daily as needed for Anxiety. , Disp: , Rfl:    Date Last Reviewed:  3/29/2022   Number of Personal Factors/Comorbidities that affect the Plan of Care: 0: LOW COMPLEXITY        EXAMINATION:   Palpation:          Mild edema at popliteal region  ROM:        R Knee 0-134 °  L knee 0-140 °                               Strength:     R hip flex 5/5, quad 4/5, HS 4/5, glut med 4/5         L LE 4+/5            Special Tests: negative anterior/posterior drawer, negative varus/valgus testing, negative mcmurrays  Neurological Screen: na  Balance:  good      Body Structures Involved:  1. Bones  2. Joints  3. Muscles Body Functions Affected:  1. Sensory/Pain  2. Neuromusculoskeletal  3. Movement Related Activities and Participation Affected:  1. General Tasks and Demands  2. Mobility  3.  Domestic Life   Number of elements (examined above) that affect the Plan of Care: 3: MODERATE COMPLEXITY   CLINICAL PRESENTATION:   Presentation: Stable and uncomplicated: LOW COMPLEXITY   CLINICAL DECISION MAKING:   Use of outcome tool(s) and clinical judgement create a POC that gives a: Clear prediction of patient's progress: LOW COMPLEXITY

## 2025-04-30 ENCOUNTER — OFFICE VISIT (OUTPATIENT)
Dept: PRIMARY CARE CLINIC | Facility: CLINIC | Age: 85
End: 2025-04-30
Payer: MEDICARE

## 2025-04-30 VITALS
SYSTOLIC BLOOD PRESSURE: 102 MMHG | BODY MASS INDEX: 17.11 KG/M2 | HEART RATE: 76 BPM | TEMPERATURE: 97.3 F | DIASTOLIC BLOOD PRESSURE: 76 MMHG | WEIGHT: 100.2 LBS | HEIGHT: 64 IN | OXYGEN SATURATION: 98 %

## 2025-04-30 DIAGNOSIS — R20.2 BILATERAL LEG PARESTHESIA: ICD-10-CM

## 2025-04-30 DIAGNOSIS — L65.9 THINNING HAIR: ICD-10-CM

## 2025-04-30 DIAGNOSIS — M81.0 AGE-RELATED OSTEOPOROSIS WITHOUT CURRENT PATHOLOGICAL FRACTURE: ICD-10-CM

## 2025-04-30 DIAGNOSIS — E56.9 HYPOVITAMINOSIS: ICD-10-CM

## 2025-04-30 DIAGNOSIS — Z76.89 ENCOUNTER TO ESTABLISH CARE WITH NEW DOCTOR: Primary | ICD-10-CM

## 2025-04-30 DIAGNOSIS — R00.2 PALPITATIONS: ICD-10-CM

## 2025-04-30 DIAGNOSIS — E78.5 HYPERLIPIDEMIA, UNSPECIFIED HYPERLIPIDEMIA TYPE: ICD-10-CM

## 2025-04-30 DIAGNOSIS — F41.1 GAD (GENERALIZED ANXIETY DISORDER): ICD-10-CM

## 2025-04-30 PROCEDURE — 1123F ACP DISCUSS/DSCN MKR DOCD: CPT | Performed by: FAMILY MEDICINE

## 2025-04-30 PROCEDURE — 99204 OFFICE O/P NEW MOD 45 MIN: CPT | Performed by: FAMILY MEDICINE

## 2025-04-30 PROCEDURE — 93000 ELECTROCARDIOGRAM COMPLETE: CPT | Performed by: FAMILY MEDICINE

## 2025-04-30 SDOH — ECONOMIC STABILITY: FOOD INSECURITY: WITHIN THE PAST 12 MONTHS, YOU WORRIED THAT YOUR FOOD WOULD RUN OUT BEFORE YOU GOT MONEY TO BUY MORE.: NEVER TRUE

## 2025-04-30 SDOH — ECONOMIC STABILITY: FOOD INSECURITY: WITHIN THE PAST 12 MONTHS, THE FOOD YOU BOUGHT JUST DIDN'T LAST AND YOU DIDN'T HAVE MONEY TO GET MORE.: NEVER TRUE

## 2025-04-30 ASSESSMENT — PATIENT HEALTH QUESTIONNAIRE - PHQ9
1. LITTLE INTEREST OR PLEASURE IN DOING THINGS: NOT AT ALL
SUM OF ALL RESPONSES TO PHQ QUESTIONS 1-9: 0
2. FEELING DOWN, DEPRESSED OR HOPELESS: NOT AT ALL
SUM OF ALL RESPONSES TO PHQ QUESTIONS 1-9: 0

## 2025-04-30 ASSESSMENT — ENCOUNTER SYMPTOMS
COUGH: 0
ROS SKIN COMMENTS: THINNING HAIR
VOMITING: 0
ABDOMINAL PAIN: 0
SHORTNESS OF BREATH: 0
NAUSEA: 0
BACK PAIN: 0
DIARRHEA: 0

## 2025-04-30 NOTE — PROGRESS NOTES
[facility] [employer]  Melodie Haque Nadja, DO  [address], [place] [address] [phone]          ASSESSMENT AND PLAN    Problem List Items Addressed This Visit          Circulatory    Palpitations     Chronic issue, sometimes feels an internal vibration or tremor, sometimes feels that her heart is racing.  EKG in the office without acute abnormalities.  Possibly related to anxiety.  Will evaluate with blood work.  Recheck in 3 months.         Relevant Orders    Vitamin B12    Folate    CBC with Auto Differential    Vitamin D 25 Hydroxy    Comprehensive Metabolic Panel    TSH    Lipid Panel    EKG 12 Lead (Completed)       Musculoskeletal and Integument    Age-related osteoporosis without current pathological fracture    Chronic, has been taking Vitamin D, Calcium and collagen powder.  Will repeat DEXA scan now that patient has been on the collagen powder for the last two years.         Relevant Orders    Vitamin D 25 Hydroxy    DEXA BONE DENSITY AXIAL SKELETON       Other    Encounter to establish care with new doctor - Primary    Bilateral leg paresthesia     Chronic issue, uncontrolled.  Patient with intermittent pins-and-needles sensation in both legs.  Denies significant back pain or previous back injuries.  Pulses within normal limits.  Will check labs.         Relevant Orders    Vitamin B12    Folate    CBC with Auto Differential    Vitamin D 25 Hydroxy    Comprehensive Metabolic Panel    TSH    Hyperlipidemia     Chronic issue, not at goal.  Patient has not been able to tolerate multiple statins.  Did discuss possibility of starting a baby aspirin every day pending results.  Patient to return for fasting labs.         Relevant Orders    Vitamin B12    Folate    CBC with Auto Differential    Vitamin D 25 Hydroxy    Comprehensive Metabolic Panel    TSH    Lipid Panel    Hypovitaminosis     Chronic issue, has had to take vitamin D supplements in the past.  Will

## 2025-04-30 NOTE — ASSESSMENT & PLAN NOTE
Chronic issue, not at goal.  Patient has not been able to tolerate multiple statins.  Did discuss possibility of starting a baby aspirin every day pending results.  Patient to return for fasting labs.

## 2025-04-30 NOTE — ASSESSMENT & PLAN NOTE
Chronic issue, uncontrolled.  Patient with intermittent pins-and-needles sensation in both legs.  Denies significant back pain or previous back injuries.  Pulses within normal limits.  Will check labs.

## 2025-04-30 NOTE — ASSESSMENT & PLAN NOTE
Chronic issue, sometimes feels an internal vibration or tremor, sometimes feels that her heart is racing.  EKG in the office without acute abnormalities.  Possibly related to anxiety.  Will evaluate with blood work.  Recheck in 3 months.

## 2025-04-30 NOTE — ASSESSMENT & PLAN NOTE
Chronic problem, notes that multiple family members have had thinning hair so thinks this may be genetic but is also very anxious.  Will check labs.

## 2025-04-30 NOTE — ASSESSMENT & PLAN NOTE
Chronic issue, not at goal.  Patient with regular anxiety, used to take a Xanax and another medication but is not currently taking anything right now.  Will monitor labs.

## 2025-04-30 NOTE — ASSESSMENT & PLAN NOTE
Chronic, has been taking Vitamin D, Calcium and collagen powder.  Will repeat DEXA scan now that patient has been on the collagen powder for the last two years.

## 2025-04-30 NOTE — PATIENT INSTRUCTIONS
IT WAS GREAT TO SEE YOU TODAY!    I WILL CONTACT YOU WITH THE RESULTS OF YOUR LABS.    PLEASE TRY TO CUT BACK ON FRIED FOOD, FAST FOOD, GREASY FOOD, RED MEAT, DAIRY PRODUCTS AND PROCESSED STARCHES (LIKE WHITE BREAD, WHITE RICE, PASTA, CHIPS, TORTILLAS, BAKED GOODS AND SWEETS).  DRINK MOSTLY WATER AND CUT BACK ON SODA, ICED TEA, JUICE, ALCOHOL AND COFFEE DRINKS.    I WILL SEE YOU AGAIN IN 3 MONTHS BUT PLEASE CALL WITH CONCERNS [phone]

## 2025-05-01 DIAGNOSIS — E56.9 HYPOVITAMINOSIS: ICD-10-CM

## 2025-05-01 DIAGNOSIS — E78.5 HYPERLIPIDEMIA, UNSPECIFIED HYPERLIPIDEMIA TYPE: ICD-10-CM

## 2025-05-01 DIAGNOSIS — R20.2 BILATERAL LEG PARESTHESIA: ICD-10-CM

## 2025-05-01 DIAGNOSIS — R00.2 PALPITATIONS: ICD-10-CM

## 2025-05-01 DIAGNOSIS — M81.0 AGE-RELATED OSTEOPOROSIS WITHOUT CURRENT PATHOLOGICAL FRACTURE: ICD-10-CM

## 2025-05-01 LAB
25(OH)D3 SERPL-MCNC: 69.4 NG/ML (ref 30–100)
ALBUMIN SERPL-MCNC: 3.7 G/DL (ref 3.2–4.6)
ALBUMIN/GLOB SERPL: 1.4 (ref 1–1.9)
ALP SERPL-CCNC: 106 U/L (ref 35–104)
ALT SERPL-CCNC: 24 U/L (ref 8–45)
ANION GAP SERPL CALC-SCNC: 11 MMOL/L (ref 7–16)
AST SERPL-CCNC: 26 U/L (ref 15–37)
BASOPHILS # BLD: 0.1 K/UL (ref 0–0.2)
BASOPHILS NFR BLD: 1.1 % (ref 0–2)
BILIRUB SERPL-MCNC: 0.6 MG/DL (ref 0–1.2)
BUN SERPL-MCNC: 18 MG/DL (ref 8–23)
CALCIUM SERPL-MCNC: 9 MG/DL (ref 8.8–10.2)
CHLORIDE SERPL-SCNC: 106 MMOL/L (ref 98–107)
CHOLEST SERPL-MCNC: 201 MG/DL (ref 0–200)
CO2 SERPL-SCNC: 28 MMOL/L (ref 20–29)
CREAT SERPL-MCNC: 0.71 MG/DL (ref 0.6–1.1)
DIFFERENTIAL METHOD BLD: ABNORMAL
EOSINOPHIL # BLD: 0.07 K/UL (ref 0–0.8)
EOSINOPHIL NFR BLD: 0.8 % (ref 0.5–7.8)
ERYTHROCYTE [DISTWIDTH] IN BLOOD BY AUTOMATED COUNT: 13.4 % (ref 11.9–14.6)
FOLATE SERPL-MCNC: 16.5 NG/ML (ref 3.1–17.5)
GLOBULIN SER CALC-MCNC: 2.6 G/DL (ref 2.3–3.5)
GLUCOSE SERPL-MCNC: 96 MG/DL (ref 70–99)
HCT VFR BLD AUTO: 39.9 % (ref 35.8–46.3)
HDLC SERPL-MCNC: 69 MG/DL (ref 40–60)
HDLC SERPL: 2.9 (ref 0–5)
HGB BLD-MCNC: 12.9 G/DL (ref 11.7–15.4)
IMM GRANULOCYTES # BLD AUTO: 0.02 K/UL (ref 0–0.5)
IMM GRANULOCYTES NFR BLD AUTO: 0.2 % (ref 0–5)
LDLC SERPL CALC-MCNC: 119 MG/DL (ref 0–100)
LYMPHOCYTES # BLD: 1.07 K/UL (ref 0.5–4.6)
LYMPHOCYTES NFR BLD: 11.7 % (ref 13–44)
MCH RBC QN AUTO: 30.4 PG (ref 26.1–32.9)
MCHC RBC AUTO-ENTMCNC: 32.3 G/DL (ref 31.4–35)
MCV RBC AUTO: 94.1 FL (ref 82–102)
MONOCYTES # BLD: 0.74 K/UL (ref 0.1–1.3)
MONOCYTES NFR BLD: 8.1 % (ref 4–12)
NEUTS SEG # BLD: 7.12 K/UL (ref 1.7–8.2)
NEUTS SEG NFR BLD: 78.1 % (ref 43–78)
NRBC # BLD: 0 K/UL (ref 0–0.2)
PLATELET # BLD AUTO: 220 K/UL (ref 150–450)
PMV BLD AUTO: 12.4 FL (ref 9.4–12.3)
POTASSIUM SERPL-SCNC: 4.3 MMOL/L (ref 3.5–5.1)
PROT SERPL-MCNC: 6.3 G/DL (ref 6.3–8.2)
RBC # BLD AUTO: 4.24 M/UL (ref 4.05–5.2)
SODIUM SERPL-SCNC: 145 MMOL/L (ref 136–145)
TRIGL SERPL-MCNC: 65 MG/DL (ref 0–150)
TSH, 3RD GENERATION: 1.76 UIU/ML (ref 0.27–4.2)
VIT B12 SERPL-MCNC: 881 PG/ML (ref 193–986)
VLDLC SERPL CALC-MCNC: 13 MG/DL (ref 6–23)
WBC # BLD AUTO: 9.1 K/UL (ref 4.3–11.1)

## 2025-05-05 ENCOUNTER — RESULTS FOLLOW-UP (OUTPATIENT)
Dept: PRIMARY CARE CLINIC | Facility: CLINIC | Age: 85
End: 2025-05-05

## 2025-05-19 ENCOUNTER — HOSPITAL ENCOUNTER (OUTPATIENT)
Dept: MAMMOGRAPHY | Age: 85
Discharge: HOME | End: 2025-05-22
Attending: FAMILY MEDICINE
Payer: MEDICARE

## 2025-05-19 DIAGNOSIS — M81.0 AGE-RELATED OSTEOPOROSIS WITHOUT CURRENT PATHOLOGICAL FRACTURE: ICD-10-CM

## 2025-05-19 PROCEDURE — 77080 DXA BONE DENSITY AXIAL: CPT

## 2025-07-28 SDOH — HEALTH STABILITY: PHYSICAL HEALTH: ON AVERAGE, HOW MANY MINUTES DO YOU ENGAGE IN EXERCISE AT THIS LEVEL?: 30 MIN

## 2025-07-28 SDOH — HEALTH STABILITY: PHYSICAL HEALTH: ON AVERAGE, HOW MANY DAYS PER WEEK DO YOU ENGAGE IN MODERATE TO STRENUOUS EXERCISE (LIKE A BRISK WALK)?: 5 DAYS

## 2025-07-28 ASSESSMENT — LIFESTYLE VARIABLES
HOW MANY STANDARD DRINKS CONTAINING ALCOHOL DO YOU HAVE ON A TYPICAL DAY: 1 OR 2
HOW OFTEN DO YOU HAVE SIX OR MORE DRINKS ON ONE OCCASION: 1
HOW OFTEN DO YOU HAVE A DRINK CONTAINING ALCOHOL: 2
HOW OFTEN DO YOU HAVE A DRINK CONTAINING ALCOHOL: MONTHLY OR LESS
HOW MANY STANDARD DRINKS CONTAINING ALCOHOL DO YOU HAVE ON A TYPICAL DAY: 1

## 2025-07-28 ASSESSMENT — PATIENT HEALTH QUESTIONNAIRE - PHQ9
SUM OF ALL RESPONSES TO PHQ QUESTIONS 1-9: 0
1. LITTLE INTEREST OR PLEASURE IN DOING THINGS: NOT AT ALL
SUM OF ALL RESPONSES TO PHQ QUESTIONS 1-9: 0
2. FEELING DOWN, DEPRESSED OR HOPELESS: NOT AT ALL

## 2025-07-31 ENCOUNTER — OFFICE VISIT (OUTPATIENT)
Dept: PRIMARY CARE CLINIC | Facility: CLINIC | Age: 85
End: 2025-07-31
Payer: MEDICARE

## 2025-07-31 VITALS
DIASTOLIC BLOOD PRESSURE: 74 MMHG | OXYGEN SATURATION: 96 % | WEIGHT: 100 LBS | BODY MASS INDEX: 17.07 KG/M2 | HEIGHT: 64 IN | HEART RATE: 67 BPM | SYSTOLIC BLOOD PRESSURE: 136 MMHG | TEMPERATURE: 98.3 F

## 2025-07-31 DIAGNOSIS — R00.2 PALPITATIONS: ICD-10-CM

## 2025-07-31 DIAGNOSIS — G89.29 CHRONIC BILATERAL LOW BACK PAIN, UNSPECIFIED WHETHER SCIATICA PRESENT: ICD-10-CM

## 2025-07-31 DIAGNOSIS — Z00.00 MEDICARE ANNUAL WELLNESS VISIT, SUBSEQUENT: Primary | ICD-10-CM

## 2025-07-31 DIAGNOSIS — M54.50 CHRONIC BILATERAL LOW BACK PAIN, UNSPECIFIED WHETHER SCIATICA PRESENT: ICD-10-CM

## 2025-07-31 DIAGNOSIS — M81.0 AGE-RELATED OSTEOPOROSIS WITHOUT CURRENT PATHOLOGICAL FRACTURE: ICD-10-CM

## 2025-07-31 DIAGNOSIS — R20.2 BILATERAL LEG PARESTHESIA: ICD-10-CM

## 2025-07-31 DIAGNOSIS — H61.23 BILATERAL IMPACTED CERUMEN: ICD-10-CM

## 2025-07-31 DIAGNOSIS — R32 URINARY INCONTINENCE, UNSPECIFIED TYPE: ICD-10-CM

## 2025-07-31 PROCEDURE — 1123F ACP DISCUSS/DSCN MKR DOCD: CPT | Performed by: FAMILY MEDICINE

## 2025-07-31 PROCEDURE — 1160F RVW MEDS BY RX/DR IN RCRD: CPT | Performed by: FAMILY MEDICINE

## 2025-07-31 PROCEDURE — G0439 PPPS, SUBSEQ VISIT: HCPCS | Performed by: FAMILY MEDICINE

## 2025-07-31 PROCEDURE — 1159F MED LIST DOCD IN RCRD: CPT | Performed by: FAMILY MEDICINE

## 2025-07-31 NOTE — ASSESSMENT & PLAN NOTE
Chronic, reviewed last DEXA that shows a progression of her osteoporosis but she refuses to take prescription medicine.

## 2025-07-31 NOTE — PATIENT INSTRUCTIONS
device in the bathroom with you.   Where can you learn more?  Go to [email or web address] and enter G117 to learn more about \"Preventing Falls: Care Instructions.\"  Current as of: July 31, 2024  Content Version: 14.5  © 3557-8907 [employer].   Care instructions adapted under license by [employer] If you have questions about a medical condition or this instruction, always ask your healthcare professional. [employer] disclaims any warranty or liability for your use of this information.         Learning About Mild Cognitive Impairment (MCI)  What is mild cognitive impairment (MCI)?     It's common to forget things sometimes as we get older. But some older people have memory loss that's more than normal aging. It's called mild cognitive impairment, or MCI. It is not the same as dementia.  People with the condition often know that their memory or mental function has changed. Tests may show some loss. But their minds work well overall. They can carry out daily tasks that are normal for them.  People with MCI have a higher chance of one day getting dementia. But not all people who have it will get dementia. Some people may stay the same over time.  What are the symptoms?  People with MCI have more memory loss than what occurs with normal aging. They may have increasing trouble with recalling words and keeping up with conversations. They may also have trouble remembering important events and making decisions.  What puts you at risk?  The risk of getting MCI increases with age. Having high blood pressure or having a family history of MCI may also increase your risk.  How is it diagnosed?  Your doctor will do a physical exam.  You may be asked questions to check your memory and other mental skills. Your doctor may also talk to close friends and family members. This can help the doctor figure out how your memory and other mental skills have changed.  You may get blood tests and tests

## 2025-07-31 NOTE — PROGRESS NOTES
Medicare Annual Wellness Visit    Marylou Cummins is here for Medicare AWV, 3 Month Follow-Up, Anxiety, Palpitations, and Tingling (In legs )    Assessment & Plan   Medicare annual wellness visit, subsequent  Bilateral leg paresthesia  Assessment & Plan:   Chronic, unchanged.  Labs from last visit reviewed.  Discussed nerve conduction study but patient wants to go back to the chiropractor first as this has helped.  Plan to order a lumbar X-ray.  Orders:  -     XR LUMBAR SPINE (MIN 4 VIEWS); Future  Chronic bilateral low back pain, unspecified whether sciatica present  -     XR LUMBAR SPINE (MIN 4 VIEWS); Future  Urinary incontinence, unspecified type  Assessment & Plan:  Chronic issue, not at goal.  Notes that she does not always make it to the bathroom in time.  Tries to do Kegel exercises.  Discussed pelvic floor therapy and patient would like to try this.  Referral placed.  Orders:  -     Ambulatory referral to Physical Therapy  Bilateral impacted cerumen  Assessment & Plan:  Chronic, notes history of this affecting her hearing.  Both ears flushed by the MA today.  Palpitations  Assessment & Plan:  Chronic, persistent issue, usually worse in the mornings.  Patient denies chest pain.  Believes this is due to anxiety.  Labs on last visit WNL.  Has had a full cardiac workup in the past that was normal.  States that she is working on techniques to help with her anxiety.  Age-related osteoporosis without current pathological fracture  Assessment & Plan:  Chronic, reviewed last DEXA that shows a progression of her osteoporosis but she refuses to take prescription medicine.       Return in 6 months (on 1/31/2026).     Subjective     83 yo female presents for Medicare AWV and follow up.  Last seen three months ago to establish care, ordered labs and a DEXA scan for osteoporosis.  Discussed her paresthesias and ordered labs.  States that she has been losing weight.  Notes that she eats three meals per day:  breakfast

## 2025-07-31 NOTE — ASSESSMENT & PLAN NOTE
Chronic, unchanged.  Labs from last visit reviewed.  Discussed nerve conduction study but patient wants to go back to the chiropractor first as this has helped.  Plan to order a lumbar X-ray.

## 2025-07-31 NOTE — ASSESSMENT & PLAN NOTE
Chronic, persistent issue, usually worse in the mornings.  Patient denies chest pain.  Believes this is due to anxiety.  Labs on last visit WNL.  Has had a full cardiac workup in the past that was normal.  States that she is working on techniques to help with her anxiety.

## 2025-07-31 NOTE — ASSESSMENT & PLAN NOTE
Chronic issue, not at goal.  Notes that she does not always make it to the bathroom in time.  Tries to do Kegel exercises.  Discussed pelvic floor therapy and patient would like to try this.  Referral placed.

## 2025-08-07 ENCOUNTER — HOSPITAL ENCOUNTER (OUTPATIENT)
Dept: GENERAL RADIOLOGY | Age: 85
Discharge: HOME | End: 2025-08-10
Payer: MEDICARE

## 2025-08-07 DIAGNOSIS — G89.29 CHRONIC BILATERAL LOW BACK PAIN, UNSPECIFIED WHETHER SCIATICA PRESENT: ICD-10-CM

## 2025-08-07 DIAGNOSIS — R20.2 BILATERAL LEG PARESTHESIA: ICD-10-CM

## 2025-08-07 DIAGNOSIS — M54.50 CHRONIC BILATERAL LOW BACK PAIN, UNSPECIFIED WHETHER SCIATICA PRESENT: ICD-10-CM

## 2025-08-07 PROCEDURE — 72110 X-RAY EXAM L-2 SPINE 4/>VWS: CPT

## (undated) DEVICE — OCCLUSIVE GAUZE STRIP,3% BISMUTH TRIBROMOPHENATE IN PETROLATUM BLEND: Brand: XEROFORM

## (undated) DEVICE — 2000CC GUARDIAN II: Brand: GUARDIAN

## (undated) DEVICE — PRECISION THIN (9.0 X 0.38 X 31.0MM)

## (undated) DEVICE — REM POLYHESIVE ADULT PATIENT RETURN ELECTRODE: Brand: VALLEYLAB

## (undated) DEVICE — INTENDED FOR TISSUE SEPARATION, AND OTHER PROCEDURES THAT REQUIRE A SHARP SURGICAL BLADE TO PUNCTURE OR CUT.: Brand: BARD-PARKER ® STAINLESS STEEL BLADES

## (undated) DEVICE — DISPOSABLE TOURNIQUET CUFF SINGLE BLADDER, DUAL PORT AND QUICK CONNECT CONNECTOR: Brand: COLOR CUFF

## (undated) DEVICE — SOLUTION IV 1000ML 0.9% SOD CHL

## (undated) DEVICE — BIT DRL L300MM DIA5MM CANN QUIK CPL ADJ STP REUSE

## (undated) DEVICE — BUTTON SWITCH PENCIL BLADE ELECTRODE, HOLSTER: Brand: EDGE

## (undated) DEVICE — DRAPE TWL SURG 16X26IN BLU ORB04] ALLCARE INC]

## (undated) DEVICE — STOCKINET,IMPERVIOUS,6X30: Brand: MEDLINE

## (undated) DEVICE — GUIDEWIRE ORTH L300MM DIA2.8MM S STL THRD SHRP TRCR TIP FOR

## (undated) DEVICE — DRAPE XR C ARM 41X74IN LF --

## (undated) DEVICE — 4.0MM ROUND FAST CUTTING BUR

## (undated) DEVICE — DRAPE SHT SPL U SHP 76X120IN --

## (undated) DEVICE — ABDOMINAL PAD: Brand: DERMACEA

## (undated) DEVICE — MARKER UTIL W/RULER AND LBL -- STRL

## (undated) DEVICE — CARDINAL HEALTH FLEXIBLE LIGHT HANDLE COVER: Brand: CARDINAL HEALTH

## (undated) DEVICE — UPPER EXTREMITY: Brand: MEDLINE INDUSTRIES, INC.

## (undated) DEVICE — PREP SKN CHLRAPRP APL 26ML STR --

## (undated) DEVICE — SPONGE LAP 18X18IN STRL -- 5/PK